# Patient Record
Sex: FEMALE | Race: WHITE | Employment: UNEMPLOYED | URBAN - METROPOLITAN AREA
[De-identification: names, ages, dates, MRNs, and addresses within clinical notes are randomized per-mention and may not be internally consistent; named-entity substitution may affect disease eponyms.]

---

## 2015-08-21 LAB — PAP-ABSTRACT: NORMAL

## 2017-03-07 ENCOUNTER — OFFICE VISIT (OUTPATIENT)
Dept: FAMILY MEDICINE | Facility: CLINIC | Age: 37
End: 2017-03-07
Payer: COMMERCIAL

## 2017-03-07 VITALS
DIASTOLIC BLOOD PRESSURE: 68 MMHG | HEIGHT: 63 IN | BODY MASS INDEX: 35.97 KG/M2 | HEART RATE: 80 BPM | SYSTOLIC BLOOD PRESSURE: 104 MMHG | TEMPERATURE: 98.1 F | OXYGEN SATURATION: 98 % | WEIGHT: 203 LBS

## 2017-03-07 DIAGNOSIS — G44.209 TENSION HEADACHE: ICD-10-CM

## 2017-03-07 DIAGNOSIS — J01.90 ACUTE SINUSITIS WITH SYMPTOMS > 10 DAYS: Primary | ICD-10-CM

## 2017-03-07 DIAGNOSIS — M25.552 HIP PAIN, LEFT: ICD-10-CM

## 2017-03-07 PROCEDURE — 99214 OFFICE O/P EST MOD 30 MIN: CPT | Performed by: NURSE PRACTITIONER

## 2017-03-07 RX ORDER — CYCLOBENZAPRINE HCL 10 MG
5-10 TABLET ORAL 3 TIMES DAILY PRN
Qty: 30 TABLET | Refills: 1 | Status: SHIPPED | OUTPATIENT
Start: 2017-03-07 | End: 2018-09-05

## 2017-03-07 NOTE — PATIENT INSTRUCTIONS
Flexeril sent to the pharmacy-for headache and hips  Augmentin sent to the pharmacy for sinus symptoms  Follow up if symptoms do not improve or worsen.  Preventing Tension Headaches  Lifestyle changes are the key to preventing tension-type headaches. Learn what changes in your environment and daily activities can prevent the strain and tension that lead to headaches. If emotional stress is a factor, stress reduction may bring relief. Other lifestyle changes can help keep you healthier and better able to cope with pain.  What may cause your headaches  Causes of tension-type headaches include:    Posture and movement. Your posture while you sit, work, drive, and even sleep can put stress on your shoulders and neck. This can tighten muscles in the back of your head, causing headaches.    Lifting and carrying. These can cause strain on your back and neck, especially if you carry too much weight, carry weight unevenly, or use poor lifting technique.    Certain sports. Activities that involve jumping, running, and sudden starts, stops, or changes of direction can jar your neck and head. This may lead to tight muscles and pain. Weightlifting and other activities that require upper body strength can lead to tight neck and shoulder muscles.    Jaw tension. Clenching your jaw or grinding your teeth can result in tension and pain. This may happen while you sleep without your knowing it.    Eye problems. Eyestrain can cause tension in the muscles around the eyes. Or a problem with your eyeglass prescription can make you hold your head at an awkward angle. This can cause neck strain and headaches.    Emotional stress. Many factors lead to emotional stress: overwork, family problems, financial difficulties, or sudden life changes. This may cause muscle tension.  What you can do  Once you know what s causing your headaches, you can work to prevent them. You may need to seek professional help to make some of the needed  changes.    Posture and movement changes. Change the setup of your workspace and car. Learn and maintain good posture. Avoid positions that strain your neck or shoulders. Make sure your bedding and pillows provide good support. Avoid sleeping on couches, chairs, or floors when a bed is available.     Lifting and carrying. Learn good lifting technique. Make sure to use the proper tools and equipment for lifting.    Change your sport. Switch to a low-impact sport. To help relieve stress on your neck and head, cut back on activities that depend on upper body strength or that put a lot of twisting motion on the back, such as golf.     Dental work. See your dentist if you think your headaches are caused by jaw tension or teeth grinding.    New eyewear. Buy an extra pair of glasses adjusted for reading or working at a computer. This helps to reduce eyestrain and keep your neck at a comfortable angle.    Stress management. Learn techniques for relaxing and reducing emotional stress, like deep breathing, visualization, progressive relaxation, and biofeedback. Regular sleep habits can also help to control stress.    Regular sleep and meals. It is important to have a regular sleep cycle and to avoid skipping meals.  Exercise can help  Exercise can improve overall health and help you to relax.    Neck exercises help keep your neck muscles relaxed during the day. Try the neck exercises shown on this sheet. Start in a neutral (relaxed, centered) position. Do 3 repetitions every 2 to 4 hours throughout the day.    Low-impact aerobic exercise helps keep your muscles strong and flexible. This helps prevent tension and the pain it causes.    Stretching, ej chi, and yoga help keep your muscles flexible. They may also help relieve emotional stress.    Lower your left ear toward your left shoulder. Return to neutral. Repeat on the right side.   Lower your chin to your chest and slowly return to neutral.     Look to the left. Return to  neutral. Then look to the right.     Move your head forward and back while keeping the top of your head level.     2654-1118 [employer]. [address] [place] [address], [place] [address]. All rights reserved. This information is not intended as a substitute for professional medical care. Always follow your healthcare professional's instructions.

## 2017-03-07 NOTE — NURSING NOTE
"Chief Complaint   Patient presents with     Sinus Problem       Initial /68 (Cuff Size: Adult Regular)  Pulse 80  Temp 98.1  F (36.7  C) (Tympanic)  Ht 5' 2.5\" (1.588 m)  Wt 203 lb (92.1 kg)  SpO2 98%  BMI 36.54 kg/m2 Estimated body mass index is 36.54 kg/(m^2) as calculated from the following:    Height as of this encounter: 5' 2.5\" (1.588 m).    Weight as of this encounter: 203 lb (92.1 kg).  Medication Reconciliation: complete    "

## 2017-03-07 NOTE — PROGRESS NOTES
SUBJECTIVE:                                                    Shy Mcfarlane is a 37 year old female who presents to clinic today for the following health issues:      ENT Symptoms             Symptoms: cc Present Absent Comment   Fever/Chills   x    Fatigue   x    Muscle Aches   x    Eye Irritation   x    Sneezing  x  Watery eyes    Nasal Alex/Drg x      Sinus Pressure/Pain x   Behind eyes    Loss of smell   x    Dental pain  x  Has had some dental work done in the last month.    Sore Throat   x    Swollen Glands   x    Ear Pain/Fullness  x  Left side    Cough   x    Wheeze   x    Chest Pain   x    Shortness of breath   x    Rash   x    Other  x  Pressure HA     Symptom duration:  3 weeks    Symptom severity:  mild   Treatments tried:  methocarbam- for jaw pain (from dentist) did not help- ibu   Contacts:  Family has had head colds.      Headache starts in neck and wraps around to temple     Joint or Musculoskeletal Pain  Duration of complaint: 1 + year   Description:   Location: left hip  Character: Dull ache  Intensity: mild  Progression of Symptoms: intermittent  Accompanying Signs & Symptoms: Other symptoms: none  History: Previous similar pain: no    Precipitating factors: Trauma or overuse: no  Alleviating factors: Improved by: activity  Therapies Tried and outcome: increasing activity     Improved with Methocarbamol from dentist for jaw pain.     Problem list and histories reviewed & adjusted, as indicated.  Additional history: as documented    There is no problem list on file for this patient.    History reviewed. No pertinent past surgical history.    Social History   Substance Use Topics     Smoking status: Former Smoker     Smokeless tobacco: Not on file     Alcohol use No     Family History   Problem Relation Age of Onset     CEREBROVASCULAR DISEASE Father      CANCER Paternal Grandmother      Lung Cancer Paternal Grandfather          Current Outpatient Prescriptions   Medication Sig Dispense  "Refill     cyclobenzaprine (FLEXERIL) 10 MG tablet Take 0.5-1 tablets (5-10 mg) by mouth 3 times daily as needed for muscle spasms 30 tablet 1     amoxicillin-clavulanate (AUGMENTIN) 875-125 MG per tablet Take 1 tablet by mouth 2 times daily 20 tablet 0     No Known Allergies  Labs reviewed in EPIC    Reviewed and updated as needed this visit by clinical staff       Reviewed and updated as needed this visit by Provider         ROS:  Constitutional, HEENT, cardiovascular, pulmonary, gi and gu systems are negative, except as otherwise noted.    OBJECTIVE:                                                    /68 (Cuff Size: Adult Regular)  Pulse 80  Temp 98.1  F (36.7  C) (Tympanic)  Ht 5' 2.5\" (1.588 m)  Wt 203 lb (92.1 kg)  SpO2 98%  BMI 36.54 kg/m2  Body mass index is 36.54 kg/(m^2).  GENERAL: healthy, alert and no distress  HENT: normal cephalic/atraumatic, both ears: clear effusion, nose and mouth without ulcers or lesions, oropharynx clear, oral mucous membranes moist and sinuses: not tender, maxillary swelling on bilateral  NECK: no adenopathy  RESP: lungs clear to auscultation - no rales, rhonchi or wheezes  CV: regular rate and rhythm, normal S1 S2, no S3 or S4, no murmur, click or rub, no peripheral edema and peripheral pulses strong  ABDOMEN: soft, nontender, no hepatosplenomegaly, no masses and bowel sounds normal  MS: tenderness present on upper trapezius and occipital area of neck, left hip with full range of motion and strength, left trochanter with tenderness to palpation  PSYCH: mentation appears normal, affect normal/bright    Diagnostic Test Results:  none      ASSESSMENT/PLAN:                                                      1. Acute sinusitis with symptoms > 10 days  Symptomatic care and follow up discussed  - amoxicillin-clavulanate (AUGMENTIN) 875-125 MG per tablet; Take 1 tablet by mouth 2 times daily  Dispense: 20 tablet; Refill: 0    2. Tension headache  Headache consistent with " tension.  Flexeril and PT ordered.  - cyclobenzaprine (FLEXERIL) 10 MG tablet; Take 0.5-1 tablets (5-10 mg) by mouth 3 times daily as needed for muscle spasms  Dispense: 30 tablet; Refill: 1  - PHYSICAL THERAPY REFERRAL    3. Hip pain, left  Ongoing left hip pain, is working on losing weight and feels that his will help.  Flexeril sent to the pharmacy and PT ordered.  - cyclobenzaprine (FLEXERIL) 10 MG tablet; Take 0.5-1 tablets (5-10 mg) by mouth 3 times daily as needed for muscle spasms  Dispense: 30 tablet; Refill: 1  - PHYSICAL THERAPY REFERRAL    Home care instructions were reviewed with the patient. The risks, benefits and treatment options of prescribed medications or other treatments have been discussed with the patient. The patient verbalized their understanding and should call or follow up if no improvement or if they develop further problems.      Patient Instructions       Flexeril sent to the pharmacy-for headache and hips  Augmentin sent to the pharmacy for sinus symptoms  Follow up if symptoms do not improve or worsen.  Preventing Tension Headaches  Lifestyle changes are the key to preventing tension-type headaches. Learn what changes in your environment and daily activities can prevent the strain and tension that lead to headaches. If emotional stress is a factor, stress reduction may bring relief. Other lifestyle changes can help keep you healthier and better able to cope with pain.  What may cause your headaches  Causes of tension-type headaches include:    Posture and movement. Your posture while you sit, work, drive, and even sleep can put stress on your shoulders and neck. This can tighten muscles in the back of your head, causing headaches.    Lifting and carrying. These can cause strain on your back and neck, especially if you carry too much weight, carry weight unevenly, or use poor lifting technique.    Certain sports. Activities that involve jumping, running, and sudden starts, stops, or  changes of direction can jar your neck and head. This may lead to tight muscles and pain. Weightlifting and other activities that require upper body strength can lead to tight neck and shoulder muscles.    Jaw tension. Clenching your jaw or grinding your teeth can result in tension and pain. This may happen while you sleep without your knowing it.    Eye problems. Eyestrain can cause tension in the muscles around the eyes. Or a problem with your eyeglass prescription can make you hold your head at an awkward angle. This can cause neck strain and headaches.    Emotional stress. Many factors lead to emotional stress: overwork, family problems, financial difficulties, or sudden life changes. This may cause muscle tension.  What you can do  Once you know what s causing your headaches, you can work to prevent them. You may need to seek professional help to make some of the needed changes.    Posture and movement changes. Change the setup of your workspace and car. Learn and maintain good posture. Avoid positions that strain your neck or shoulders. Make sure your bedding and pillows provide good support. Avoid sleeping on couches, chairs, or floors when a bed is available.     Lifting and carrying. Learn good lifting technique. Make sure to use the proper tools and equipment for lifting.    Change your sport. Switch to a low-impact sport. To help relieve stress on your neck and head, cut back on activities that depend on upper body strength or that put a lot of twisting motion on the back, such as golf.     Dental work. See your dentist if you think your headaches are caused by jaw tension or teeth grinding.    New eyewear. Buy an extra pair of glasses adjusted for reading or working at a computer. This helps to reduce eyestrain and keep your neck at a comfortable angle.    Stress management. Learn techniques for relaxing and reducing emotional stress, like deep breathing, visualization, progressive relaxation, and  biofeedback. Regular sleep habits can also help to control stress.    Regular sleep and meals. It is important to have a regular sleep cycle and to avoid skipping meals.  Exercise can help  Exercise can improve overall health and help you to relax.    Neck exercises help keep your neck muscles relaxed during the day. Try the neck exercises shown on this sheet. Start in a neutral (relaxed, centered) position. Do 3 repetitions every 2 to 4 hours throughout the day.    Low-impact aerobic exercise helps keep your muscles strong and flexible. This helps prevent tension and the pain it causes.    Stretching, ej chi, and yoga help keep your muscles flexible. They may also help relieve emotional stress.    Lower your left ear toward your left shoulder. Return to neutral. Repeat on the right side.   Lower your chin to your chest and slowly return to neutral.     Look to the left. Return to neutral. Then look to the right.     Move your head forward and back while keeping the top of your head level.     3835-3123 [employer]. [address] [place] [address], [place] [id number]. All rights reserved. This information is not intended as a substitute for professional medical care. Always follow your healthcare professional's instructions.            YULI Goldman [facility]

## 2017-03-07 NOTE — MR AVS SNAPSHOT
After Visit Summary   3/7/2017    Shy Mcfarlane    MRN: [id number]           Patient Information     Date Of Birth          1980        Visit Information        Provider Department      3/7/2017 8:20 AM Laurence Nesbitt APRN [facility]        Today's Diagnoses     Acute sinusitis with symptoms > 10 days    -  1    Tension headache        Hip pain, left          Care Instructions      Flexeril sent to the pharmacy-for headache and hips  Augmentin sent to the pharmacy for sinus symptoms  Follow up if symptoms do not improve or worsen.  Preventing Tension Headaches  Lifestyle changes are the key to preventing tension-type headaches. Learn what changes in your environment and daily activities can prevent the strain and tension that lead to headaches. If emotional stress is a factor, stress reduction may bring relief. Other lifestyle changes can help keep you healthier and better able to cope with pain.  What may cause your headaches  Causes of tension-type headaches include:    Posture and movement. Your posture while you sit, work, drive, and even sleep can put stress on your shoulders and neck. This can tighten muscles in the back of your head, causing headaches.    Lifting and carrying. These can cause strain on your back and neck, especially if you carry too much weight, carry weight unevenly, or use poor lifting technique.    Certain sports. Activities that involve jumping, running, and sudden starts, stops, or changes of direction can jar your neck and head. This may lead to tight muscles and pain. Weightlifting and other activities that require upper body strength can lead to tight neck and shoulder muscles.    Jaw tension. Clenching your jaw or grinding your teeth can result in tension and pain. This may happen while you sleep without your knowing it.    Eye problems. Eyestrain can cause tension in the muscles around the eyes. Or a problem with your eyeglass  prescription can make you hold your head at an awkward angle. This can cause neck strain and headaches.    Emotional stress. Many factors lead to emotional stress: overwork, family problems, financial difficulties, or sudden life changes. This may cause muscle tension.  What you can do  Once you know what s causing your headaches, you can work to prevent them. You may need to seek professional help to make some of the needed changes.    Posture and movement changes. Change the setup of your workspace and car. Learn and maintain good posture. Avoid positions that strain your neck or shoulders. Make sure your bedding and pillows provide good support. Avoid sleeping on couches, chairs, or floors when a bed is available.     Lifting and carrying. Learn good lifting technique. Make sure to use the proper tools and equipment for lifting.    Change your sport. Switch to a low-impact sport. To help relieve stress on your neck and head, cut back on activities that depend on upper body strength or that put a lot of twisting motion on the back, such as golf.     Dental work. See your dentist if you think your headaches are caused by jaw tension or teeth grinding.    New eyewear. Buy an extra pair of glasses adjusted for reading or working at a computer. This helps to reduce eyestrain and keep your neck at a comfortable angle.    Stress management. Learn techniques for relaxing and reducing emotional stress, like deep breathing, visualization, progressive relaxation, and biofeedback. Regular sleep habits can also help to control stress.    Regular sleep and meals. It is important to have a regular sleep cycle and to avoid skipping meals.  Exercise can help  Exercise can improve overall health and help you to relax.    Neck exercises help keep your neck muscles relaxed during the day. Try the neck exercises shown on this sheet. Start in a neutral (relaxed, centered) position. Do 3 repetitions every 2 to 4 hours throughout the  day.    Low-impact aerobic exercise helps keep your muscles strong and flexible. This helps prevent tension and the pain it causes.    Stretching, ej chi, and yoga help keep your muscles flexible. They may also help relieve emotional stress.    Lower your left ear toward your left shoulder. Return to neutral. Repeat on the right side.   Lower your chin to your chest and slowly return to neutral.     Look to the left. Return to neutral. Then look to the right.     Move your head forward and back while keeping the top of your head level.     2470-0606 [employer] [facility]. [address] [place] [address]. All rights reserved. This information is not intended as a substitute for professional medical care. Always follow your healthcare professional's instructions.              Follow-ups after your visit        Additional Services     PHYSICAL THERAPY REFERRAL       *This therapy referral will be filtered to a centralized scheduling office at [facility] and the patient will receive a call to schedule an appointment at a [place] location most convenient for them. *     [facility] provides Physical Therapy evaluation and treatment and many specialty services across the [place] system.  If requesting a specialty program, please choose from the list below.    If you have not heard from the scheduling office within 2 business days, please call [phone] for all locations, with the exception of [place], please call [phone].  Treatment: Evaluation & Treatment  Special Instructions/Modalities:   Special Programs: None    Please be aware that coverage of these services is subject to the terms and limitations of your health insurance plan.  Call member services at your health plan with any benefit or coverage questions.      **Note to Provider:  If you are referring outside of [place] for the therapy appointment, please list the name of the location in the  " special instructions  above, print the referral and give to the patient to schedule the appointment.                  Who to contact     If you have questions or need follow up information about today's clinic visit or your schedule please contact [facility] directly at [phone].  Normal or non-critical lab and imaging results will be communicated to you by MyChart, letter or phone within 4 business days after the clinic has received the results. If you do not hear from us within 7 days, please contact the clinic through [email or web address] or phone. If you have a critical or abnormal lab result, we will notify you by phone as soon as possible.  Submit refill requests through [email or web address] or call your pharmacy and they will forward the refill request to us. Please allow 3 business days for your refill to be completed.          Additional Information About Your Visit        [email or web address]"[employer] Information     [email or web address] lets you send messages to your doctor, view your test results, renew your prescriptions, schedule appointments and more. To sign up, go to [email or web address] . Click on \"Log in\" on the left side of the screen, which will take you to the Welcome page. Then click on \"Sign up Now\" on the right side of the page.     You will be asked to enter the access code listed below, as well as some personal information. Please follow the directions to create your username and password.     Your access code is: [email or web address]  Expires: 2017  9:07 AM     Your access code will  in 90 days. If you need help or a new code, please call your [facility] or [phone].        Care EveryWhere ID     This is your Care EveryWhere ID. This could be used by other organizations to access your [place] medical records  [id number]        Your Vitals Were     Pulse Temperature Height Pulse Oximetry BMI (Body Mass Index)       80 98.1  F (36.7  C) (Tympanic) 5' 2.5\" (1.588 m) 98% 36.54 kg/m2        Blood Pressure from " Last 3 Encounters:   03/07/17 104/68    Weight from Last 3 Encounters:   03/07/17 203 lb (92.1 kg)              We Performed the Following     PHYSICAL THERAPY REFERRAL          Today's Medication Changes          These changes are accurate as of: 3/7/17  9:07 AM.  If you have any questions, ask your nurse or doctor.               Start taking these medicines.        Dose/Directions    amoxicillin-clavulanate 875-125 MG per tablet   Commonly known as:  AUGMENTIN   Used for:  Acute sinusitis with symptoms > 10 days   Started by:  Laurence Nesbitt APRN CNP        Dose:  1 tablet   Take 1 tablet by mouth 2 times daily   Quantity:  20 tablet   Refills:  0       cyclobenzaprine 10 MG tablet   Commonly known as:  FLEXERIL   Used for:  Tension headache, Hip pain, left   Started by:  Laurence Nesbitt APRN CNP        Dose:  5-10 mg   Take 0.5-1 tablets (5-10 mg) by mouth 3 times daily as needed for muscle spasms   Quantity:  30 tablet   Refills:  1            Where to get your medicines      These medications were sent to [id number] [address] [facility]  [address] [facility] [address]    Hours:  Closed 10-16-08 business to [facility] Phone:  [phone]     amoxicillin-clavulanate 875-125 MG per tablet    cyclobenzaprine 10 MG tablet                Primary Care Provider Office Phone # Fax #    YULI Coelho [phone]       [facility] [address] [facility] [address]        Thank you!     Thank you for choosing [facility]  for your care. Our goal is always to provide you with excellent care. Hearing back from our patients is one way we can continue to improve our services. Please take a few minutes to complete the written survey that you may receive in the mail after your visit with us. Thank you!             Your Updated Medication List - Protect others around you: Learn how to safely use, store and  throw away your medicines at www.disposemymeds.org.          This list is accurate as of: 3/7/17  9:07 AM.  Always use your most recent med list.                   Brand Name Dispense Instructions for use    amoxicillin-clavulanate 875-125 MG per tablet    AUGMENTIN    20 tablet    Take 1 tablet by mouth 2 times daily       cyclobenzaprine 10 MG tablet    FLEXERIL    30 tablet    Take 0.5-1 tablets (5-10 mg) by mouth 3 times daily as needed for muscle spasms

## 2017-06-03 ENCOUNTER — HEALTH MAINTENANCE LETTER (OUTPATIENT)
Age: 37
End: 2017-06-03

## 2018-09-05 ENCOUNTER — OFFICE VISIT (OUTPATIENT)
Dept: FAMILY MEDICINE | Facility: CLINIC | Age: 38
End: 2018-09-05
Payer: COMMERCIAL

## 2018-09-05 VITALS
TEMPERATURE: 97.3 F | HEART RATE: 88 BPM | OXYGEN SATURATION: 99 % | DIASTOLIC BLOOD PRESSURE: 80 MMHG | BODY MASS INDEX: 34.91 KG/M2 | WEIGHT: 197 LBS | HEIGHT: 63 IN | SYSTOLIC BLOOD PRESSURE: 118 MMHG

## 2018-09-05 DIAGNOSIS — Z30.46 NEXPLANON REMOVAL: ICD-10-CM

## 2018-09-05 DIAGNOSIS — Z00.00 ROUTINE GENERAL MEDICAL EXAMINATION AT A HEALTH CARE FACILITY: Primary | ICD-10-CM

## 2018-09-05 DIAGNOSIS — Z30.017 NEXPLANON INSERTION: ICD-10-CM

## 2018-09-05 LAB
ANION GAP SERPL CALCULATED.3IONS-SCNC: 5 MMOL/L (ref 3–14)
BUN SERPL-MCNC: 9 MG/DL (ref 7–30)
CALCIUM SERPL-MCNC: 8.8 MG/DL (ref 8.5–10.1)
CHLORIDE SERPL-SCNC: 109 MMOL/L (ref 94–109)
CHOLEST SERPL-MCNC: 218 MG/DL
CO2 SERPL-SCNC: 26 MMOL/L (ref 20–32)
CREAT SERPL-MCNC: 0.87 MG/DL (ref 0.52–1.04)
GFR SERPL CREATININE-BSD FRML MDRD: 72 ML/MIN/1.7M2
GLUCOSE SERPL-MCNC: 80 MG/DL (ref 70–99)
HDLC SERPL-MCNC: 35 MG/DL
LDLC SERPL CALC-MCNC: 149 MG/DL
NONHDLC SERPL-MCNC: 183 MG/DL
POTASSIUM SERPL-SCNC: 3.8 MMOL/L (ref 3.4–5.3)
SODIUM SERPL-SCNC: 140 MMOL/L (ref 133–144)
TRIGL SERPL-MCNC: 172 MG/DL

## 2018-09-05 PROCEDURE — 99395 PREV VISIT EST AGE 18-39: CPT | Mod: 25 | Performed by: NURSE PRACTITIONER

## 2018-09-05 PROCEDURE — 11983 REMOVE/INSERT DRUG IMPLANT: CPT | Performed by: NURSE PRACTITIONER

## 2018-09-05 PROCEDURE — 87624 HPV HI-RISK TYP POOLED RSLT: CPT | Performed by: NURSE PRACTITIONER

## 2018-09-05 PROCEDURE — G0145 SCR C/V CYTO,THINLAYER,RESCR: HCPCS | Performed by: NURSE PRACTITIONER

## 2018-09-05 PROCEDURE — 80061 LIPID PANEL: CPT | Performed by: NURSE PRACTITIONER

## 2018-09-05 PROCEDURE — 80048 BASIC METABOLIC PNL TOTAL CA: CPT | Performed by: NURSE PRACTITIONER

## 2018-09-05 PROCEDURE — 36415 COLL VENOUS BLD VENIPUNCTURE: CPT | Performed by: NURSE PRACTITIONER

## 2018-09-05 NOTE — LETTER
September 12, 2018    Shy Mcfarlane   [address] [place] [address]    Dear Shy,  We are happy to inform you that your PAP smear result from 9/5/18 is normal.  We are now able to do a follow up test on PAP smears. The DNA test is for HPV (Human Papilloma Virus). Cervical cancer is closely linked with certain types of HPV. Your results showed no evidence of high risk HPV.  Therefore we recommend you return in 5 years for your next pap smear and HPV test.  You will still need to return to the clinic every year for an annual exam and other preventive tests.  Please contact the clinic at [phone] with any questions.  Sincerely,    Kristin Tena, YULI CNP/rlm

## 2018-09-05 NOTE — MR AVS SNAPSHOT
After Visit Summary   9/5/2018    Shy Mcfarlane    MRN: [id number]           Patient Information     Date Of Birth          1980        Visit Information        Provider Department      9/5/2018 9:00 AM Kristin Tena APRN CNP [facility]        Today's Diagnoses     Routine general medical examination at a health care facility    -  1    Nexplanon insertion        Nexplanon removal          Care Instructions      Preventive Health Recommendations  Female Ages 26 - 39  Yearly exam:   See your health care provider every year in order to    Review health changes.     Discuss preventive care.      Review your medicines if you your doctor has prescribed any.    Until age 30: Get a Pap test every three years (more often if you have had an abnormal result).    After age 30: Talk to your doctor about whether you should have a Pap test every 3 years or have a Pap test with HPV screening every 5 years.   You do not need a Pap test if your uterus was removed (hysterectomy) and you have not had cancer.  You should be tested each year for STDs (sexually transmitted diseases), if you're at risk.   Talk to your provider about how often to have your cholesterol checked.  If you are at risk for diabetes, you should have a diabetes test (fasting glucose).  Shots: Get a flu shot each year. Get a tetanus shot every 10 years.   Nutrition:     Eat at least 5 servings of fruits and vegetables each day.    Eat whole-grain bread, whole-wheat pasta and brown rice instead of white grains and rice.    Get adequate Calcium and Vitamin D.     Lifestyle    Exercise at least 150 minutes a week (30 minutes a day, 5 days of the week). This will help you control your weight and prevent disease.    Limit alcohol to one drink per day.    No smoking.     Wear sunscreen to prevent skin cancer.    See your dentist every six months for an exam and cleaning.  [facility] Mammo Schedule    [place] ~  [phone]  One day weekly-Alternating Days    [place] ~ [phone]  Every other Monday or Wednesday &  One Saturday morning a month    [place] ~ [phone]  Every Other Monday afternoon     [place] ~[phone]  Every other Monday morning    [place] ~ [phone]  Every Monday Morning  Every Tuesday Afternoon   Wed, Thurs, Friday morning and afternoon    Prevention Guidelines, Women Ages 18 to 39  Screening tests and vaccines are an important part of managing your health. A screening test is done to find possible disorders or diseases in people who don't have any symptoms. The goal is to find a disease early so lifestyle changes can be made and you can be watched more closely to reduce the risk of disease, or to detect it early enough to treat it most effectively. Screening tests are not considered diagnostic, but are used to determine if more testing is needed. Health counseling is essential, too. Below are guidelines for these, for women ages 18 to 39. Talk with your healthcare provider to make sure you re up-to-date on what you need.  Screening Who needs it How often   Alcohol misuse All women in this age group At routine exams   Blood pressure All women in this age group Yearly checkup if your blood pressure is normal  Normal blood pressure is less than 120/80 mm Hg  If your blood pressure reading is higher than normal, follow the advice of your healthcare provider   Breast cancer All women in this age group should talk with their healthcare providers about the need for clinical breast exams (CBE)1 Clinical breast exam every 3 years1   Cervical cancer Women ages 21 and older Women between ages 21 and 29 should have a Pap test every 3 years; women between ages 30 and 65 are advised to have a Pap test plus an HPV test every 5 years   Chlamydia Sexually active women ages 24 and younger, and women at increased risk for infection Every 3 years if you're at risk or have symptoms   Depression All  women in this age group At routine exams   Type 2 diabates, prediabetes All women with no symptoms who are overweight or obese and have 1 or more other risk factors for diabetes At least every 3 years. Also, testing for diabetes during pregnancy after the 24th week.    Type 2 diabetes, prediabetes All women diagnosed with gestational diabetes Lifelong testing every 3 years   Type 2 diabetes All women with prediabetes Every year   Gonorrhea Sexually active women at increased risk for infection At routine exams   Hepatitis C Anyone at increased risk At routine exams   HIV All women should be tested at least once for HIV between the ages of 13 and 64 At routine exams. Those with risk factors for HIV should be tested at least annually.    Obesity All women in this age group At routine exams   Syphilis Women at increased risk for infection should talk with their healthcare provider At routine exams   Tuberculosis Women at increased risk for infection should talk with their healthcare provider Ask your healthcare provider   Vision All women in this age group At least 1 complete exam in your 20s, and 2 in your 30s   Vaccine2 Who needs it How often   Chickenpox (varicella) All women in this age group who have no record of this infection or vaccine 2 doses; the second dose should be given 4 to 8 weeks after the first dose   Hepatitis A Women at increased risk for infection should talk with their healthcare provider 2 doses given at least 6 months apart   Hepatitis B Women at increased risk for infection should talk with their healthcare provider 3 doses over 6 months; second dose should be given 1 month after the first dose; the third dose should be given at least 2 months after the second dose and at least 4 months after the first dose   Haemophilus influenzae Type B (HIB) Women at increased risk for infection should talk with their healthcare provider 1 to 3 doses   Human papillomavirus (HPV) All women in this age group up  to age 26 3 doses; the second dose should be given 1 to 2 months after the first dose and the third dose given 6 months after the first dose   Influenza (flu) All women in this age group Once a year   Measles, mumps, rubella (MMR) All women in this age group who have no record of these infections or vaccines 1 or 2 doses   Meningococcal Women at increased risk for infection should talk with their healthcare provider 1 or more doses   Pneumococcal conjugate vaccine (PCV13) and pneumococcal polysaccharide vaccine (PPSV23) Women at increased risk for infection should talk with their healthcare provider PCV13: 1 dose ages 19 to 65 (protects against 13 types of pneumococcal bacteria)  PPSV23: 1 to 2 doses through age 64, or 1 dose at 65 or older (protects against 23 types of pneumococcal bacteria)      Tetanus/diphtheria/pertussis (Td/Tdap) booster All women in this age group Td every 10 years, or a one-time dose of Tdap instead of a Td booster after age 18, then Td every 10 years   Counseling Who needs it How often   BRCA gene mutation testing for breast and ovarian cancer susceptibility Women with increased risk for having gene mutation When your risk is known   Breast cancer and chemoprevention Women at high risk for breast cancer When your risk is known   Diet and exercise Women who are overweight or obese When diagnosed, and then at routine exams   Domestic violence Women at the age in which they are able to have children At routine exams   Sexually transmitted infection prevention Women who are sexually active At routine exams   Skin cancer Prevention of skin cancer in fair-skinned adults At routine exams   Use of tobacco and the health effects it can cause All women in this age group Every visit   1According to the ACS, women ages 20 to 39 years should have a clinical breast exam (CBE) as part of their routine health exam every 3 years. Breast self-exams are an option for women starting in their 20s. But the  USPSTF does not recommend CBE.  2Those who are 18 years old and not up-to-date on their childhood vaccines should get all appropriate catch-up vaccines recommended by the CDC.  Date Last Reviewed: 10/1/2017    2402-9889 [employer]. [address] [place] [address]91. All rights reserved. This information is not intended as a substitute for professional medical care. Always follow your healthcare professional's instructions.        Etonogestrel implant  Brand Name: Nexplanon  What is this medicine?  ETONOGESTREL (et oh rc CHANEL trel) is a contraceptive (birth control) device. It is used to prevent pregnancy. It can be used for up to 3 years.  How should I use this medicine?  This device is inserted just under the skin on the inner side of your upper arm by a health care professional.  Talk to your pediatrician regarding the use of this medicine in children. Special care may be needed.  What side effects may I notice from receiving this medicine?  Side effects that you should report to your doctor or health care professional as soon as possible:    allergic reactions like skin rash, itching or hives, swelling of the face, lips, or tongue    breast lumps    changes in emotions or moods    depressed mood    heavy or prolonged menstrual bleeding    pain, irritation, swelling, or bruising at the insertion site    scar at site of insertion    signs of infection at the insertion site such as fever, and skin redness, pain or discharge    signs of pregnancy    signs and symptoms of a blood clot such as breathing problems; changes in vision; chest pain; severe, sudden headache; pain, swelling, warmth in the leg; trouble speaking; sudden numbness or weakness of the face, arm or leg    signs and symptoms of liver injury like dark yellow or brown urine; general ill feeling or flu-like symptoms; light-colored stools; loss of appetite; nausea; right upper belly pain; unusually weak or tired; yellowing of the  eyes or skin    unusual vaginal bleeding, discharge    signs and symptoms of a stroke like changes in vision; confusion; trouble speaking or understanding; severe headaches; sudden numbness or weakness of the face, arm or leg; trouble walking; dizziness; loss of balance or coordination  Side effects that usually do not require medical attention (report to your doctor or health care professional if they continue or are bothersome):    acne    back pain    breast pain    changes in weight    dizziness    general ill feeling or flu-like symptoms    headache    irregular menstrual bleeding    nausea    sore throat    vaginal irritation or inflammation  What may interact with this medicine?  Do not take this medicine with any of the following medications:    amprenavir    bosentan    fosamprenavir  This medicine may also interact with the following medications:    barbiturate medicines for inducing sleep or treating seizures    certain medicines for fungal infections like ketoconazole and itraconazole    grapefruit juice    griseofulvin    medicines to treat seizures like carbamazepine, felbamate, oxcarbazepine, phenytoin, topiramate    modafinil    phenylbutazone    rifampin    rufinamide    some medicines to treat HIV infection like atazanavir, indinavir, lopinavir, nelfinavir, tipranavir, ritonavir    Navya's wort  What if I miss a dose?  This does not apply.  Where should I keep my medicine?  This drug is given in a hospital or clinic and will not be stored at home.  What should I tell my health care provider before I take this medicine?  They need to know if you have any of these conditions:    abnormal vaginal bleeding    blood vessel disease or blood clots    cancer of the breast, cervix, or liver    depression    diabetes    gallbladder disease    headaches    heart disease or recent heart attack    high blood pressure    high cholesterol    kidney disease    liver disease    renal  disease    seizures    tobacco smoker    an unusual or allergic reaction to etonogestrel, other hormones, anesthetics or antiseptics, medicines, foods, dyes, or preservatives    pregnant or trying to get pregnant    breast-feeding  What should I watch for while using this medicine?  This product does not protect you against HIV infection (AIDS) or other sexually transmitted diseases.  You should be able to feel the implant by pressing your fingertips over the skin where it was inserted. Contact your doctor if you cannot feel the implant, and use a non-hormonal birth control method (such as condoms) until your doctor confirms that the implant is in place. If you feel that the implant may have broken or become bent while in your arm, contact your healthcare provider.  NOTE:This sheet is a summary. It may not cover all possible information. If you have questions about this medicine, talk to your doctor, pharmacist, or health care provider. Copyright  2018 [employer]                Follow-ups after your visit        Future tests that were ordered for you today     Open Future Orders        Priority Expected Expires Ordered    *MA Screening Digital Bilateral Routine  9/5/2019 9/5/2018            Who to contact     If you have questions or need follow up information about today's clinic visit or your schedule please contact [facility] directly at [phone].  Normal or non-critical lab and imaging results will be communicated to you by MyChart, letter or phone within 4 business days after the clinic has received the results. If you do not hear from us within 7 days, please contact the clinic through MyChart or phone. If you have a critical or abnormal lab result, we will notify you by phone as soon as possible.  Submit refill requests through [employer] or call your pharmacy and they will forward the refill request to us. Please allow 3 business days for your refill to be completed.          Additional  "Information About Your Visit        MyChart Information     [employer] lets you send messages to your doctor, view your test results, renew your prescriptions, schedule appointments and more. To sign up, go to [email or web address] . Click on \"Log in\" on the left side of the screen, which will take you to the Welcome page. Then click on \"Sign up Now\" on the right side of the page.     You will be asked to enter the access code listed below, as well as some personal information. Please follow the directions to create your username and password.     Your access code is: [email or web address]  Expires: 2018  9:57 AM     Your access code will  in 90 days. If you need help or a new code, please call your [facility] clinic or [phone].        Care EveryWhere ID     This is your Care EveryWhere ID. This could be used by other organizations to access your [facility] medical records  [id number]        Your Vitals Were     Pulse Temperature Height Last Period Pulse Oximetry BMI (Body Mass Index)    88 97.3  F (36.3  C) (Tympanic) 5' 2.5\" (1.588 m) 2018 99% 35.46 kg/m2       Blood Pressure from Last 3 Encounters:   18 118/80   17 104/68   09/11/15 120/68    Weight from Last 3 Encounters:   18 197 lb (89.4 kg)   17 203 lb (92.1 kg)   09/11/15 208 lb 6 oz (94.5 kg)              We Performed the Following     Basic metabolic panel     Lipid Profile (Chol, Trig, HDL, LDL calc)          Today's Medication Changes          These changes are accurate as of 18  9:57 AM.  If you have any questions, ask your nurse or doctor.               Stop taking these medicines if you haven't already. Please contact your care team if you have questions.     amoxicillin-clavulanate 875-125 MG per tablet   Commonly known as:  AUGMENTIN   Stopped by:  Kristin Tena APRN CNP           cyclobenzaprine 10 MG tablet   Commonly known as:  FLEXERIL   Stopped by:  Kristin Tena APRN CNP                    Primary " Care Provider Office Phone # Fax #    YULI Coelho [phone] [address] [place] [id number]        Equal Access to Services     RONALD MILLAN : Hadii aad ku hadalfreditoo Sopravinali, waaxda luqadaha, qaybta kaalmada ademarlyda, herminio garciarosa saenzramiro elmorebuddy lópez. So [facility] [phone].    ATENCIÓN: Si habla español, tiene a sierra disposición servicios gratuitos de asistencia lingüística. Llame al [phone].    We comply with applicable federal civil rights laws and [place] laws. We do not discriminate on the basis of race, color, national origin, age, disability, sex, sexual orientation, or gender identity.            Thank you!     Thank you for choosing [facility]  for your care. Our goal is always to provide you with excellent care. Hearing back from our patients is one way we can continue to improve our services. Please take a few minutes to complete the written survey that you may receive in the mail after your visit with us. Thank you!             Your Updated Medication List - Protect others around you: Learn how to safely use, store and throw away your medicines at www.disposemymeds.org.      Notice  As of 9/5/2018  9:57 AM    You have not been prescribed any medications.

## 2018-09-05 NOTE — PROGRESS NOTES
SUBJECTIVE:   CC: Shy Mcfarlane is an 38 year old woman who presents for preventive health visit.     Answers for HPI/ROS submitted by the patient on 9/5/2018   Annual Exam:  Getting at least 3 servings of Calcium per day:: NO  Bi-annual eye exam:: Yes  Dental care twice a year:: Yes  Sleep apnea or symptoms of sleep apnea:: None  Diet:: Regular (no restrictions)  Frequency of exercise:: None  Taking medications regularly:: Not Applicable  Medication side effects:: Not applicable  Additional concerns today:: No  PHQ-2 Score: 0      Today's PHQ-2 Score:   PHQ-2 ( 1999 [employer]) 9/5/2018   Q1: Little interest or pleasure in doing things 0   Q2: Feeling down, depressed or hopeless 0   PHQ-2 Score 0   Q1: Little interest or pleasure in doing things Not at all   Q2: Feeling down, depressed or hopeless Not at all   PHQ-2 Score 0       Abuse: Current or Past(Physical, Sexual or Emotional)- No  Do you feel safe in your environment - Yes    Social History   Substance Use Topics     Smoking status: Former Smoker     Smokeless tobacco: Never Used     Alcohol use No     If you drink alcohol do you typically have >3 drinks per day or >7 drinks per week? No                     Reviewed orders with patient.  Reviewed health maintenance and updated orders accordingly - Yes  BP Readings from Last 3 Encounters:   09/05/18 118/80   03/07/17 104/68   09/11/15 120/68    Wt Readings from Last 3 Encounters:   09/05/18 197 lb (89.4 kg)   03/07/17 203 lb (92.1 kg)   09/11/15 208 lb 6 oz (94.5 kg)                  There is no problem list on file for this patient.    History reviewed. No pertinent surgical history.    Social History   Substance Use Topics     Smoking status: Former Smoker     Smokeless tobacco: Never Used     Alcohol use No     Family History   Problem Relation Age of Onset     Cerebrovascular Disease Father      Cancer Paternal Grandmother      Lung Cancer Paternal Grandfather          No current outpatient  "prescriptions on file.       Mammogram not appropriate for this patient based on age.    Pertinent mammograms are reviewed under the imaging tab.  History of abnormal Pap smear: NO - age 30-65 PAP every 5 years with negative HPV co-testing recommended     Reviewed and updated as needed this visit by clinical staff  Tobacco  Allergies  Med Hx  Surg Hx  Fam Hx  Soc Hx        Reviewed and updated as needed this visit by Provider        Past Medical History:   Diagnosis Date     Skin cancer       History reviewed. No pertinent surgical history.  Obstetric History     No data available          ROS:  CONSTITUTIONAL: NEGATIVE for fever, chills, change in weight  INTEGUMENTARU/SKIN: NEGATIVE for worrisome rashes, moles or lesions  EYES: NEGATIVE for vision changes or irritation  ENT: NEGATIVE for ear, mouth and throat problems  RESP: NEGATIVE for significant cough or SOB  BREAST: NEGATIVE for masses, tenderness or discharge  CV: NEGATIVE for chest pain, palpitations or peripheral edema  GI: NEGATIVE for nausea, abdominal pain, heartburn, or change in bowel habits  : NEGATIVE for unusual urinary or vaginal symptoms. Periods are regular.  MUSCULOSKELETAL: NEGATIVE for significant arthralgias or myalgia  NEURO: NEGATIVE for weakness, dizziness or paresthesias  PSYCHIATRIC: NEGATIVE for changes in mood or affect    OBJECTIVE:   /80 (BP Location: Right arm)  Pulse 88  Temp 97.3  F (36.3  C) (Tympanic)  Ht 5' 2.5\" (1.588 m)  Wt 197 lb (89.4 kg)  LMP 08/20/2018  SpO2 99%  BMI 35.46 kg/m2  EXAM:  GENERAL: healthy, alert and no distress  EYES: Eyes grossly normal to inspection, PERRL and conjunctivae and sclerae normal  HENT: ear canals and TM's normal, nose and mouth without ulcers or lesions  NECK: no adenopathy, no asymmetry, masses, or scars and thyroid normal to palpation  RESP: lungs clear to auscultation - no rales, rhonchi or wheezes  BREAST: normal without masses, tenderness or nipple discharge and no " palpable axillary masses or adenopathy  CV: regular rate and rhythm, normal S1 S2, no S3 or S4, no murmur, click or rub, no peripheral edema and peripheral pulses strong  ABDOMEN: soft, nontender, no hepatosplenomegaly, no masses and bowel sounds normal  MS: no gross musculoskeletal defects noted, no edema  SKIN: no suspicious lesions or rashes  NEURO: Normal strength and tone, mentation intact and speech normal  PSYCH: mentation appears normal, affect normal/bright    No pregnancy  test nexplanon is current and is replacing the nexplanon unit today     ASSESSMENT/PLAN:       ICD-10-CM    1. Routine general medical examination at a health care facility Z00.00 Basic metabolic panel     Lipid Profile (Chol, Trig, HDL, LDL calc)     Pap imaged thin layer screen with HPV - recommended age 30 - 65 years (select HPV order below)     HPV High Risk Types DNA Cervical     CANCELED: *MA Screening Digital Bilateral   2. Nexplanon insertion Z30.017 etonogestrel (IMPLANON/NEXPLANON) 68 MG IMPL     ETONOGESTREL IMPLANT SYSTEM     REMOVAL AND REINSERTION NEXPLANON   3. Nexplanon removal Z30.46 etonogestrel (IMPLANON/NEXPLANON) 68 MG IMPL     ETONOGESTREL IMPLANT SYSTEM     REMOVAL AND REINSERTION NEXPLANON       The procedure was explained to the patient and informed Consent was obtained    Left  arm placed above head with the patient in a supine position on the examining bed.  Nexplaonon placed site was identified, cleaned with betadine.  Using a 15 scalpel a 3 mm incision was made, using a small curved atrial forceps, the tip of the nexplaonon as identified and pulled out without complications.      Appropriate for Nexplanon Insertion today      PROCEDURE: Consent was reviewed and signed.  Patient was placed supine with left arm exposed.  Adal was made 8-10 cm above medial epicondyle and a guiding adal 4 cm above the first.  Arm was prepped with Alcohol. Insertion point was anesthetized with 2 mL 1% lidocaine. After stretching  "the skin with thumb and index finger around the insertion site, skin punctured with the tip of the needle inserted at 30 degrees and then lowered to horizontal position. The needle was then advanced to its full length. Applicator was then stabilized and slider was unlocked. Slider was pulled back until it stopped and then removed. Steri strips were placed.   Correct placement of the implant was confirmed by palpation in the patient's arm and visualizing the purple top of the obturator.   Bandage and pressure dressing applied to insertion site. Pt tolerated procedure well without complications. EBL was minimal.      COUNSELING:   Reviewed preventive health counseling, as reflected in patient instructions       Regular exercise       Healthy diet/nutrition       Vision screening       Hearing screening       Contraception       Family planning       Safe sex practices/STD prevention    BP Readings from Last 1 Encounters:   09/05/18 118/80     Estimated body mass index is 35.46 kg/(m^2) as calculated from the following:    Height as of this encounter: 5' 2.5\" (1.588 m).    Weight as of this encounter: 197 lb (89.4 kg).      Weight management plan: Discussed healthy diet and exercise guidelines and patient will follow up in 3 months in clinic to re-evaluate.     reports that she has quit smoking. She has never used smokeless tobacco.     Counseling Resources:  ATP IV Guidelines  Pooled Cohorts Equation Calculator  Breast Cancer Risk Calculator  FRAX Risk Assessment  ICSI Preventive Guidelines  Dietary Guidelines for Americans, 2010  USDA's MyPlate  ASA Prophylaxis  Lung CA Screening    YULI Mejia CNP  [facility]  "

## 2018-09-05 NOTE — LETTER
September 6, 2018      Shydelfin Mcfarlane  [address] [place] [id number]        Dear ,    We are writing to inform you of your test results.    negative basic panel.   your cholesterol level is elevated at 218 her good cholesterol which is the HDL is at 35 we like to see this above 49 and her LDL is at 149 we like to see that below 100 at this point I would recommend starting a cholesterol medication and working on diet and exercise.    As we discussed on the phone, work on diet and exercise and recheck a fasting lipid profile in 3 months. (By a lab only appointment).    Resulted Orders   Basic metabolic panel   Result Value Ref Range    Sodium 140 133 - 144 mmol/L    Potassium 3.8 3.4 - 5.3 mmol/L    Chloride 109 94 - 109 mmol/L    Carbon Dioxide 26 20 - 32 mmol/L    Anion Gap 5 3 - 14 mmol/L    Glucose 80 70 - 99 mg/dL      Comment:      Non Fasting    Urea Nitrogen 9 7 - 30 mg/dL    Creatinine 0.87 0.52 - 1.04 mg/dL    GFR Estimate 72 >60 mL/min/1.7m2      Comment:      Non  GFR Calc    GFR Estimate If Black 88 >60 mL/min/1.7m2      Comment:       GFR Calc    Calcium 8.8 8.5 - 10.1 mg/dL   Lipid Profile (Chol, Trig, HDL, LDL calc)   Result Value Ref Range    Cholesterol 218 (H) <200 mg/dL      Comment:      Desirable:       <200 mg/dl    Triglycerides 172 (H) <150 mg/dL      Comment:      Borderline high:  150-199 mg/dl  High:             200-499 mg/dl  Very high:       >499 mg/dl  Non Fasting      HDL Cholesterol 35 (L) >49 mg/dL    LDL Cholesterol Calculated 149 (H) <100 mg/dL      Comment:      Above desirable:  100-129 mg/dl  Borderline High:  130-159 mg/dL  High:             160-189 mg/dL  Very high:       >189 mg/dl      Non HDL Cholesterol 183 (H) <130 mg/dL      Comment:      Above Desirable:  130-159 mg/dl  Borderline high:  160-189 mg/dl  High:             190-219 mg/dl  Very high:       >219 mg/dl         If you have any questions or concerns, please call the  clinic at the number listed above.       Sincerely,        Kristin Tena, YULI CNP/dw        Eating Heart-Healthy Food: Using the DASH Plan    Eating for your heart doesn t have to be hard or boring. You just need to know how to make healthier choices. The DASH eating plan has been developed to help you do just that. DASH stands for Dietary Approaches to Stop Hypertension. It is a plan that has been proven to be healthier for your heart and to lower your risk for high blood pressure. It can also help lower your risk for cancer, heart disease, osteoporosis, and diabetes.  Choosing from each food group  Choose foods from each of the food groups below each day. Try to get the recommended number of servings for each food group. The serving numbers are based on a diet of 2,000 calories a day. Talk to your doctor if you re unsure about your calorie needs. Along with getting the correct servings, the DASH plan also recommends a sodium intake less than 2,300 mg per day.          Grains  Servings: 6 to 8 a day  A serving is:    1 slice bread    1 ounce dry cereal    Half a cup cooked rice, pasta or cereal  Best choices: Whole grains and any grains high in fiber. Vegetables  Servings: 4 to 5 a day  A serving is:    1 cup raw leafy vegetable    Half a cup cut-up raw or cooked vegetable    Half a cup vegetable juice  Best choices: Fresh or frozen vegetables prepared without added salt or fat.   Fruits  Servings: 4 to 5 a day  A serving is:    1 medium fruit    One-quarter cup dried fruit    Half a cup fresh, frozen, or canned fruit    Half a cup of 100% fruit juices  Best choices: A variety of fresh fruits of different colors. Whole fruits are a better choice than fruit juices. Low-fat or fat-free dairy  Servings: 2 to 3 a day  A serving is:    1 cup milk    1 cup yogurt    One and a half ounces cheese  Best choices: Skim or 1% milk, low-fat or fat-free yogurt or buttermilk, and low-fat cheeses.           Lean meats, poultry,  fish  Servings: 6 or fewer a day  A serving is:    1 ounce cooked meats, poultry, or fish    1 egg  Best choices: Lean poultry and fish. Trim away visible fat. Broil, grill, roast, or boil instead of frying. Remove skin from poultry before eating. Limit how much red meat you eat.  Nuts, seeds, beans  Servings: 4 to 5 a week  A serving is:    One-third cup nuts (one and a half ounces)    2 tablespoons nut butter or seeds    Half a cup cooked dry beans or legumes  Best choices: Dry roasted nuts with no salt added, lentils, kidney beans, garbanzo beans, and whole duran beans.   Fats and oils  Servings: 2 to 3 a day  A serving is:    1 teaspoon vegetable oil    1 teaspoon soft margarine    1 tablespoon mayonnaise    2 tablespoons salad dressing  Best choices: Nut and vegetable oils (nontropical vegetable oils), such as olive and canola oil. Sweets  Servings: 5 a week or fewer  A serving is:    1 tablespoon sugar, maple syrup, or honey    1 tablespoon jam or jelly    1 half-ounce jelly beans (about 15)    1 cup lemonade  Best choices: Dried fruit can be a satisfying sweet. Choose low-fat sweets. And watch your serving sizes!       For more on the DASH eating plan, visit:  www.nhlbi.nih.gov/health/health-topics/topics/dash   Date Last Reviewed: 6/1/2016 2000-2017 [employer]. [address] [place] [address]. All rights reserved. This information is not intended as a substitute for professional medical care. Always follow your healthcare professional's instructions.        Lifestyle Changes to Control Cholesterol  You can control your cholesterol through diet, exercise, weight management, quitting smoking, stress management, and taking your medicines right. These things can also lower your risk for cardiovascular disease.    Eating healthy  Your healthcare provider will give you information on diet changes you may need to make. Your provider may recommend that you see a registered dietitian for  help with diet changes. Changes may include:    Cutting back on the amount of fat and cholesterol in your meals    Eating less salt (sodium). This is especially important if you have high blood pressure.    Eating more fresh vegetables and fruits    Eating lean proteins such as fish, poultry, beans, and peas    Eating less red meat and processed meats    Using low-fat dairy products    Using vegetable and nut oils in limited amounts    Limiting how many sweets and processed foods like chips, cookies, and baked goods that you eat     Limiting how many sugar-sweetened beverages you drink    Limiting how often you eat out  Getting exercise  Regular exercise is a good way to help your body control cholesterol. Regular exercise can help in many ways. It can:    Raise your good cholesterol    Help lower your bad cholesterol    Let blood flow better through your body    Give more oxygen to your muscles and tissues    Help you manage your weight    Help your heart pump better    Lower your blood pressure  Your healthcare provider may recommend that you get more physical activity if you haven't been active. Your provider may recommend that you get moderate to vigorous physical activity for at least 40 minutes each day. You should do this for at least 3 to 4 days each week. A few examples of moderate to vigorous activity are:    Walking at a brisk pace. This is about 3 to 4 miles per hour.    Jogging or running    Swimming or water aerobics    Hiking    Dancing    Martial arts    Tennis    Riding a bicycle or stationary bike    Dancing  Managing your weight  If you are overweight or obese, your healthcare provider will work with you to help you lose weight and lower your BMI (body mass index). Making diet changes and getting more physical activity can help. Changing your diet will help you lose weight more easily than adding exercise.  Quitting smoking  Smoking and other tobacco use can raise cholesterol and make it harder to  control. Quitting is tough. But millions of people have given up tobacco for good. You can quit, too! Think about some of the reasons below to quit smoking. Do any of them make you think twice about your smoking habit?  Stop smoking because it:    Keeps your cholesterol high, even if you make all the other changes you re supposed to    Damages your body. It especially harms your heart, lungs, skin, and blood vessels.    Makes you more likely to have a heart attack (acute myocardial infarction), stroke, or cancer    Stains your teeth    Makes your skin, clothes, and breath smell bad    Costs a lot of money  Controlling stress   Learn ways to control stress. This will help you deal with stress in your home and work life. Controlling stress can greatly lower your risk of getting cardiovascular disease.  Making the most of medicines  Healthy eating and exercise are a good start to keeping your cholesterol down. But you may need some extra help from medicine. If your doctor prescribes medicine, be sure to take it exactly as directed. Remember:    Tell your healthcare provider about all other medicines you take. This includes vitamins and herbs.    Tell your healthcare provider if you have any side effects after starting to take a medicine. Examples of side effects to watch for include muscle aches, weakness, blurred vision, rust-colored urine, yellowing of eyes or skin (jaundice), and headache.    Don t skip a dose or stop taking your medicine because you feel better or because your cholesterol numbers go down. Never stop taking your medicine unless your healthcare provider has told you it s OK.    Ask your healthcare provider if you have any questions about your medicines.  High risk groups  Some people may need to take medicines called statins to control their cholesterol. This is in addition to eating a healthy diet and getting regular exercise.  Statins can help you stay healthy. They can also help prevent a heart  attack or stroke. You may need to take a statin if you are in one of these groups:    Adults who have had a heart attack or stroke. Or adults who have had peripheral vascular disease, a ministroke (transient ischemic attack), or stable or unstable angina. This group also includes people who have had a procedure to restore blood flow through a blocked artery. These procedures include percutaneous coronary intervention, angioplasty, stent, and open-heart bypass surgery.    Adults who have diabetes. Or adults who are at higher risk of having a heart attack or stroke and have an LDL cholesterol level of 70 to 189 mg/dL    Adults who are 21 years old or older and have an LDL cholesterol level of 190 mg/dL or higher.  If you are in a high-risk group, talk with your healthcare provider about your treatment goals. Make sure you understand why these goals are important, based on your own health history and your family history of heart disease or high cholesterol.  Make a plan to have regular cholesterol checks. You may need to fast before getting this test. Also ask your provider about any side effects your medicines may cause. Let your provider know about any side effects you have. You may need to take more than one medicine to reach the cholesterol goals that you and your provider decide on.  Date Last Reviewed: 10/1/2016    3936-3351 [employer]. [address] [place] [address], [place] [address]. All rights reserved. This information is not intended as a substitute for professional medical care. Always follow your healthcare professional's instructions.

## 2018-09-05 NOTE — NURSING NOTE
"Chief Complaint   Patient presents with     Physical     routine       Initial /80 (BP Location: Right arm)  Pulse 88  Temp 97.3  F (36.3  C) (Tympanic)  Ht 5' 2.5\" (1.588 m)  Wt 197 lb (89.4 kg)  LMP 08/20/2018  SpO2 99%  BMI 35.46 kg/m2 Estimated body mass index is 35.46 kg/(m^2) as calculated from the following:    Height as of this encounter: 5' 2.5\" (1.588 m).    Weight as of this encounter: 197 lb (89.4 kg).      Health Maintenance that is potentially due pending provider review:  NONE    n/a    Is there anyone who you would like to be able to receive your results? No  If yes have patient fill out DAVID    "

## 2018-09-05 NOTE — PATIENT INSTRUCTIONS
Preventive Health Recommendations  Female Ages 26 - 39  Yearly exam:   See your health care provider every year in order to    Review health changes.     Discuss preventive care.      Review your medicines if you your doctor has prescribed any.    Until age 30: Get a Pap test every three years (more often if you have had an abnormal result).    After age 30: Talk to your doctor about whether you should have a Pap test every 3 years or have a Pap test with HPV screening every 5 years.   You do not need a Pap test if your uterus was removed (hysterectomy) and you have not had cancer.  You should be tested each year for STDs (sexually transmitted diseases), if you're at risk.   Talk to your provider about how often to have your cholesterol checked.  If you are at risk for diabetes, you should have a diabetes test (fasting glucose).  Shots: Get a flu shot each year. Get a tetanus shot every 10 years.   Nutrition:     Eat at least 5 servings of fruits and vegetables each day.    Eat whole-grain bread, whole-wheat pasta and brown rice instead of white grains and rice.    Get adequate Calcium and Vitamin D.     Lifestyle    Exercise at least 150 minutes a week (30 minutes a day, 5 days of the week). This will help you control your weight and prevent disease.    Limit alcohol to one drink per day.    No smoking.     Wear sunscreen to prevent skin cancer.    See your dentist every six months for an exam and cleaning.  [facility] Mammo Schedule    [facility] ~ [phone]  One day weekly-Alternating Days    [place] ~ [phone]  Every other Monday or Wednesday &  One Saturday morning a month    [place] ~ [phone]  Every Other Monday afternoon     [place] ~[phone]  Every other Monday morning    [place] ~ [phone]  Every Monday Morning  Every Tuesday Afternoon   Wed, Thurs, Friday morning and afternoon    Prevention Guidelines, Women Ages 18 to 39  Screening tests and vaccines are an important part  of managing your health. A screening test is done to find possible disorders or diseases in people who don't have any symptoms. The goal is to find a disease early so lifestyle changes can be made and you can be watched more closely to reduce the risk of disease, or to detect it early enough to treat it most effectively. Screening tests are not considered diagnostic, but are used to determine if more testing is needed. Health counseling is essential, too. Below are guidelines for these, for women ages 18 to 39. Talk with your healthcare provider to make sure you re up-to-date on what you need.  Screening Who needs it How often   Alcohol misuse All women in this age group At routine exams   Blood pressure All women in this age group Yearly checkup if your blood pressure is normal  Normal blood pressure is less than 120/80 mm Hg  If your blood pressure reading is higher than normal, follow the advice of your healthcare provider   Breast cancer All women in this age group should talk with their healthcare providers about the need for clinical breast exams (CBE)1 Clinical breast exam every 3 years1   Cervical cancer Women ages 21 and older Women between ages 21 and 29 should have a Pap test every 3 years; women between ages 30 and 65 are advised to have a Pap test plus an HPV test every 5 years   Chlamydia Sexually active women ages 24 and younger, and women at increased risk for infection Every 3 years if you're at risk or have symptoms   Depression All women in this age group At routine exams   Type 2 diabates, prediabetes All women with no symptoms who are overweight or obese and have 1 or more other risk factors for diabetes At least every 3 years. Also, testing for diabetes during pregnancy after the 24th week.    Type 2 diabetes, prediabetes All women diagnosed with gestational diabetes Lifelong testing every 3 years   Type 2 diabetes All women with prediabetes Every year   Gonorrhea Sexually active women at  increased risk for infection At routine exams   Hepatitis C Anyone at increased risk At routine exams   HIV All women should be tested at least once for HIV between the ages of 13 and 64 At routine exams. Those with risk factors for HIV should be tested at least annually.    Obesity All women in this age group At routine exams   Syphilis Women at increased risk for infection should talk with their healthcare provider At routine exams   Tuberculosis Women at increased risk for infection should talk with their healthcare provider Ask your healthcare provider   Vision All women in this age group At least 1 complete exam in your 20s, and 2 in your 30s   Vaccine2 Who needs it How often   Chickenpox (varicella) All women in this age group who have no record of this infection or vaccine 2 doses; the second dose should be given 4 to 8 weeks after the first dose   Hepatitis A Women at increased risk for infection should talk with their healthcare provider 2 doses given at least 6 months apart   Hepatitis B Women at increased risk for infection should talk with their healthcare provider 3 doses over 6 months; second dose should be given 1 month after the first dose; the third dose should be given at least 2 months after the second dose and at least 4 months after the first dose   Haemophilus influenzae Type B (HIB) Women at increased risk for infection should talk with their healthcare provider 1 to 3 doses   Human papillomavirus (HPV) All women in this age group up to age 26 3 doses; the second dose should be given 1 to 2 months after the first dose and the third dose given 6 months after the first dose   Influenza (flu) All women in this age group Once a year   Measles, mumps, rubella (MMR) All women in this age group who have no record of these infections or vaccines 1 or 2 doses   Meningococcal Women at increased risk for infection should talk with their healthcare provider 1 or more doses   Pneumococcal conjugate vaccine  (PCV13) and pneumococcal polysaccharide vaccine (PPSV23) Women at increased risk for infection should talk with their healthcare provider PCV13: 1 dose ages 19 to 65 (protects against 13 types of pneumococcal bacteria)  PPSV23: 1 to 2 doses through age 64, or 1 dose at 65 or older (protects against 23 types of pneumococcal bacteria)      Tetanus/diphtheria/pertussis (Td/Tdap) booster All women in this age group Td every 10 years, or a one-time dose of Tdap instead of a Td booster after age 18, then Td every 10 years   Counseling Who needs it How often   BRCA gene mutation testing for breast and ovarian cancer susceptibility Women with increased risk for having gene mutation When your risk is known   Breast cancer and chemoprevention Women at high risk for breast cancer When your risk is known   Diet and exercise Women who are overweight or obese When diagnosed, and then at routine exams   Domestic violence Women at the age in which they are able to have children At routine exams   Sexually transmitted infection prevention Women who are sexually active At routine exams   Skin cancer Prevention of skin cancer in fair-skinned adults At routine exams   Use of tobacco and the health effects it can cause All women in this age group Every visit   1According to the ACS, women ages 20 to 39 years should have a clinical breast exam (CBE) as part of their routine health exam every 3 years. Breast self-exams are an option for women starting in their 20s. But the USPSTF does not recommend CBE.  2Those who are 18 years old and not up-to-date on their childhood vaccines should get all appropriate catch-up vaccines recommended by the CDC.  Date Last Reviewed: 10/1/2017    9493-5240 [employer] [address] [place] [address], [place] [address]. All rights reserved. This information is not intended as a substitute for professional medical care. Always follow your healthcare professional's instructions.        Etonogestrel  implant  Brand Name: Nexplanon  What is this medicine?  ETONOGESTREL (et oh rc CHANEL trel) is a contraceptive (birth control) device. It is used to prevent pregnancy. It can be used for up to 3 years.  How should I use this medicine?  This device is inserted just under the skin on the inner side of your upper arm by a health care professional.  Talk to your pediatrician regarding the use of this medicine in children. Special care may be needed.  What side effects may I notice from receiving this medicine?  Side effects that you should report to your doctor or health care professional as soon as possible:    allergic reactions like skin rash, itching or hives, swelling of the face, lips, or tongue    breast lumps    changes in emotions or moods    depressed mood    heavy or prolonged menstrual bleeding    pain, irritation, swelling, or bruising at the insertion site    scar at site of insertion    signs of infection at the insertion site such as fever, and skin redness, pain or discharge    signs of pregnancy    signs and symptoms of a blood clot such as breathing problems; changes in vision; chest pain; severe, sudden headache; pain, swelling, warmth in the leg; trouble speaking; sudden numbness or weakness of the face, arm or leg    signs and symptoms of liver injury like dark yellow or brown urine; general ill feeling or flu-like symptoms; light-colored stools; loss of appetite; nausea; right upper belly pain; unusually weak or tired; yellowing of the eyes or skin    unusual vaginal bleeding, discharge    signs and symptoms of a stroke like changes in vision; confusion; trouble speaking or understanding; severe headaches; sudden numbness or weakness of the face, arm or leg; trouble walking; dizziness; loss of balance or coordination  Side effects that usually do not require medical attention (report to your doctor or health care professional if they continue or are bothersome):    acne    back pain    breast  pain    changes in weight    dizziness    general ill feeling or flu-like symptoms    headache    irregular menstrual bleeding    nausea    sore throat    vaginal irritation or inflammation  What may interact with this medicine?  Do not take this medicine with any of the following medications:    amprenavir    bosentan    fosamprenavir  This medicine may also interact with the following medications:    barbiturate medicines for inducing sleep or treating seizures    certain medicines for fungal infections like ketoconazole and itraconazole    grapefruit juice    griseofulvin    medicines to treat seizures like carbamazepine, felbamate, oxcarbazepine, phenytoin, topiramate    modafinil    phenylbutazone    rifampin    rufinamide    some medicines to treat HIV infection like atazanavir, indinavir, lopinavir, nelfinavir, tipranavir, ritonavir    [place]'s wort  What if I miss a dose?  This does not apply.  Where should I keep my medicine?  This drug is given in a hospital or clinic and will not be stored at home.  What should I tell my health care provider before I take this medicine?  They need to know if you have any of these conditions:    abnormal vaginal bleeding    blood vessel disease or blood clots    cancer of the breast, cervix, or liver    depression    diabetes    gallbladder disease    headaches    heart disease or recent heart attack    high blood pressure    high cholesterol    kidney disease    liver disease    renal disease    seizures    tobacco smoker    an unusual or allergic reaction to etonogestrel, other hormones, anesthetics or antiseptics, medicines, foods, dyes, or preservatives    pregnant or trying to get pregnant    breast-feeding  What should I watch for while using this medicine?  This product does not protect you against HIV infection (AIDS) or other sexually transmitted diseases.  You should be able to feel the implant by pressing your fingertips over the skin where it was inserted.  Contact your doctor if you cannot feel the implant, and use a non-hormonal birth control method (such as condoms) until your doctor confirms that the implant is in place. If you feel that the implant may have broken or become bent while in your arm, contact your healthcare provider.  NOTE:This sheet is a summary. It may not cover all possible information. If you have questions about this medicine, talk to your doctor, pharmacist, or health care provider. Copyright  2018 [employer]

## 2018-09-06 NOTE — PROGRESS NOTES
Please Notify Shy  of test results negative basic panel.   your cholesterol level is elevated at 218 her good cholesterol which is the HDL is at 35 we like to see this above 49 and her LDL is at 149 we like to see that below 100 at this point I would recommend starting a cholesterol medication and working on diet and exercise.  at this point I would start Lipitor 10 mg daily patient in agreement please send in a prescription for Lipitor 10 mg daily 90 day supply and 3 refills and have follow-up in 3-6 months for her cholesterol.      Kristin Tena CNP

## 2018-09-07 LAB
COPATH REPORT: NORMAL
PAP: NORMAL

## 2018-09-08 ENCOUNTER — RADIANT APPOINTMENT (OUTPATIENT)
Dept: MAMMOGRAPHY | Facility: CLINIC | Age: 38
End: 2018-09-08
Payer: COMMERCIAL

## 2018-09-08 DIAGNOSIS — Z12.31 VISIT FOR SCREENING MAMMOGRAM: ICD-10-CM

## 2018-09-08 DIAGNOSIS — Z00.00 ROUTINE GENERAL MEDICAL EXAMINATION AT A HEALTH CARE FACILITY: ICD-10-CM

## 2018-09-08 PROCEDURE — 77067 SCR MAMMO BI INCL CAD: CPT | Mod: TC

## 2018-09-11 LAB
FINAL DIAGNOSIS: NORMAL
HPV HR 12 DNA CVX QL NAA+PROBE: NEGATIVE
HPV16 DNA SPEC QL NAA+PROBE: NEGATIVE
HPV18 DNA SPEC QL NAA+PROBE: NEGATIVE
SPECIMEN DESCRIPTION: NORMAL
SPECIMEN SOURCE CVX/VAG CYTO: NORMAL

## 2018-09-11 NOTE — PROGRESS NOTES
Please Notify Shy  of test results negative mammogram to have annual mammograms     Kristin Tena CNP

## 2018-10-23 ENCOUNTER — OFFICE VISIT (OUTPATIENT)
Dept: URGENT CARE | Facility: URGENT CARE | Age: 38
End: 2018-10-23
Payer: COMMERCIAL

## 2018-10-23 VITALS
WEIGHT: 190.2 LBS | TEMPERATURE: 100.2 F | DIASTOLIC BLOOD PRESSURE: 70 MMHG | SYSTOLIC BLOOD PRESSURE: 112 MMHG | BODY MASS INDEX: 34.23 KG/M2 | RESPIRATION RATE: 16 BRPM | HEART RATE: 68 BPM

## 2018-10-23 DIAGNOSIS — J02.9 SORE THROAT: ICD-10-CM

## 2018-10-23 DIAGNOSIS — R50.9 FEVER, UNSPECIFIED FEVER CAUSE: ICD-10-CM

## 2018-10-23 DIAGNOSIS — J03.90 TONSILLITIS: Primary | ICD-10-CM

## 2018-10-23 LAB
BASOPHILS # BLD AUTO: 0 10E9/L (ref 0–0.2)
BASOPHILS NFR BLD AUTO: 0.1 %
DEPRECATED S PYO AG THROAT QL EIA: NORMAL
DIFFERENTIAL METHOD BLD: NORMAL
EOSINOPHIL # BLD AUTO: 0 10E9/L (ref 0–0.7)
EOSINOPHIL NFR BLD AUTO: 0.2 %
ERYTHROCYTE [DISTWIDTH] IN BLOOD BY AUTOMATED COUNT: 12.7 % (ref 10–15)
FLUAV+FLUBV AG SPEC QL: NEGATIVE
FLUAV+FLUBV AG SPEC QL: NEGATIVE
HCT VFR BLD AUTO: 40 % (ref 35–47)
HGB BLD-MCNC: 13.5 G/DL (ref 11.7–15.7)
LYMPHOCYTES # BLD AUTO: 1.4 10E9/L (ref 0.8–5.3)
LYMPHOCYTES NFR BLD AUTO: 16.6 %
MCH RBC QN AUTO: 30.8 PG (ref 26.5–33)
MCHC RBC AUTO-ENTMCNC: 33.8 G/DL (ref 31.5–36.5)
MCV RBC AUTO: 91 FL (ref 78–100)
MONOCYTES # BLD AUTO: 0.9 10E9/L (ref 0–1.3)
MONOCYTES NFR BLD AUTO: 10.8 %
NEUTROPHILS # BLD AUTO: 6.3 10E9/L (ref 1.6–8.3)
NEUTROPHILS NFR BLD AUTO: 72.3 %
PLATELET # BLD AUTO: 207 10E9/L (ref 150–450)
RBC # BLD AUTO: 4.39 10E12/L (ref 3.8–5.2)
SPECIMEN SOURCE: NORMAL
SPECIMEN SOURCE: NORMAL
WBC # BLD AUTO: 8.7 10E9/L (ref 4–11)

## 2018-10-23 PROCEDURE — 99213 OFFICE O/P EST LOW 20 MIN: CPT | Performed by: PHYSICIAN ASSISTANT

## 2018-10-23 PROCEDURE — 87880 STREP A ASSAY W/OPTIC: CPT | Performed by: PHYSICIAN ASSISTANT

## 2018-10-23 PROCEDURE — 36415 COLL VENOUS BLD VENIPUNCTURE: CPT | Performed by: PHYSICIAN ASSISTANT

## 2018-10-23 PROCEDURE — 85025 COMPLETE CBC W/AUTO DIFF WBC: CPT | Performed by: PHYSICIAN ASSISTANT

## 2018-10-23 PROCEDURE — 87081 CULTURE SCREEN ONLY: CPT | Performed by: PHYSICIAN ASSISTANT

## 2018-10-23 PROCEDURE — 87804 INFLUENZA ASSAY W/OPTIC: CPT | Performed by: PHYSICIAN ASSISTANT

## 2018-10-23 ASSESSMENT — ENCOUNTER SYMPTOMS
SORE THROAT: 1
RHINORRHEA: 0
UNEXPECTED WEIGHT CHANGE: 0
ARTHRALGIAS: 0
VOMITING: 0
SHORTNESS OF BREATH: 0
MYALGIAS: 0
EYE REDNESS: 0
DIARRHEA: 0
WHEEZING: 0
CHILLS: 0
BACK PAIN: 0
ABDOMINAL PAIN: 0
FATIGUE: 0
NAUSEA: 0
EYE PAIN: 0
CHEST TIGHTNESS: 0
COUGH: 0
PALPITATIONS: 0
SINUS PRESSURE: 0
FEVER: 1
TROUBLE SWALLOWING: 0

## 2018-10-23 NOTE — NURSING NOTE
"Chief Complaint   Patient presents with     Pharyngitis     started Friday.  Getting worse.  Headache, fever, and body aches.  Ibuprofen/ Tylenol        Initial /70 (BP Location: Right arm, Cuff Size: Adult Regular)  Pulse 68  Temp 100.2  F (37.9  C) (Tympanic)  Resp 16  Wt 194 lb 3.2 oz (88.1 kg)  BMI 34.95 kg/m2 Estimated body mass index is 34.95 kg/(m^2) as calculated from the following:    Height as of 9/5/18: 5' 2.5\" (1.588 m).    Weight as of this encounter: 194 lb 3.2 oz (88.1 kg).    Patient presents to the clinic using No DME    Health Maintenance that is potentially due pending provider review:  NONE    n/a    Is there anyone who you would like to be able to receive your results? Not Applicable  If yes have patient fill out DAVID  Mimi Bagley M.A.      "

## 2018-10-23 NOTE — MR AVS SNAPSHOT
After Visit Summary   10/23/2018    Shy Mcfarlane    MRN: [id number]           Patient Information     Date Of Birth          1980        Visit Information        Provider Department      10/23/2018 6:40 PM Beatrice Lowery PA-C [facility]        Today's Diagnoses     Tonsillitis    -  1    Sore throat        Fever, unspecified fever cause           Follow-ups after your visit        Follow-up notes from your care team     Return if symptoms worsen or fail to improve.      Who to contact     If you have questions or need follow up information about today's clinic visit or your schedule please contact [facility] directly at [phone].  Normal or non-critical lab and imaging results will be communicated to you by MyChart, letter or phone within 4 business days after the clinic has received the results. If you do not hear from us within 7 days, please contact the clinic through MyChart or phone. If you have a critical or abnormal lab result, we will notify you by phone as soon as possible.  Submit refill requests through [employer] or call your pharmacy and they will forward the refill request to us. Please allow 3 business days for your refill to be completed.          Additional Information About Your Visit        Care EveryWhere ID     This is your Care EveryWhere ID. This could be used by other organizations to access your [facility] medical records  [id number]        Your Vitals Were     Pulse Temperature Respirations BMI (Body Mass Index)          68 100.2  F (37.9  C) (Tympanic) 16 34.23 kg/m2         Blood Pressure from Last 3 Encounters:   10/23/18 112/70   09/05/18 118/80   03/07/17 104/68    Weight from Last 3 Encounters:   10/23/18 190 lb 3.2 oz (86.3 kg)   09/05/18 197 lb (89.4 kg)   03/07/17 203 lb (92.1 kg)              We Performed the Following     Beta strep group A culture     CBC with platelets differential      Influenza A/B antigen     Strep, Rapid Screen          Today's Medication Changes          These changes are accurate as of 10/23/18 11:59 PM.  If you have any questions, ask your nurse or doctor.               Start taking these medicines.        Dose/Directions    amoxicillin-clavulanate 875-125 MG per tablet   Commonly known as:  AUGMENTIN   Used for:  Tonsillitis   Started by:  Beatrice Lowery PA-C        Dose:  1 tablet   Take 1 tablet by mouth 2 times daily for 10 days   Quantity:  20 tablet   Refills:  0            Where to get your medicines      These medications were sent to [id number] [address]  [address] [address]    Hours:  Closed 10-16-08 business to [facility] Phone:  [phone]     amoxicillin-clavulanate 875-125 MG per tablet                Primary Care Provider Office Phone # Fax #    Laurence Nesbitt, APRN [phone] [id number] [facility] [address]        Equal Access to Services     ALEXA MILLAN : Hadii emily velázquez hadasho Soomaali, waaxda luqadaha, qaybta kaalmada adeegyada, herminio khoury . So [facility] [phone].    ATENCIÓN: Si habla español, tiene a sierra disposición servicios gratuitos de asistencia lingüística. Llame al [phone].    We comply with applicable federal civil rights laws and [place] laws. We do not discriminate on the basis of race, color, national origin, age, disability, sex, sexual orientation, or gender identity.            Thank you!     Thank you for choosing [facility]  for your care. Our goal is always to provide you with excellent care. Hearing back from our patients is one way we can continue to improve our services. Please take a few minutes to complete the written survey that you may receive in the mail after your visit with us. Thank you!             Your Updated Medication List - Protect others around you:  Learn how to safely use, store and throw away your medicines at www.disposemymeds.org.          This list is accurate as of 10/23/18 11:59 PM.  Always use your most recent med list.                   Brand Name Dispense Instructions for use Diagnosis    amoxicillin-clavulanate 875-125 MG per tablet    AUGMENTIN    20 tablet    Take 1 tablet by mouth 2 times daily for 10 days    Tonsillitis       etonogestrel 68 MG Impl    IMPLANON/NEXPLANON    1 each    1 each (68 mg) by Subdermal route once for 1 dose    Nexplanon insertion, Nexplanon removal

## 2018-10-24 LAB
BACTERIA SPEC CULT: NORMAL
SPECIMEN SOURCE: NORMAL

## 2018-10-24 NOTE — PROGRESS NOTES
SUBJECTIVE:   Shy Mcfarlane is a 38 year old female presenting with a chief complaint of   Chief Complaint   Patient presents with     Pharyngitis     started Friday.  Getting worse.  Headache, fever, and body aches.  Ibuprofen/ Tylenol        She is an established patient of [facility].    URI Adult    Onset of symptoms was 4 day(s) ago.  Course of illness is same.    Severity moderate  Current and Associated symptoms: fever and sore throat  Treatment measures tried include Tylenol/Ibuprofen.  Predisposing factors include None.    Review of Systems   Constitutional: Positive for fever. Negative for chills, fatigue and unexpected weight change.   HENT: Positive for sore throat. Negative for congestion, ear pain, postnasal drip, rhinorrhea, sinus pressure and trouble swallowing.    Eyes: Negative for pain, redness and visual disturbance.   Respiratory: Negative for cough, chest tightness, shortness of breath and wheezing.    Cardiovascular: Negative for chest pain and palpitations.   Gastrointestinal: Negative for abdominal pain, diarrhea, nausea and vomiting.   Musculoskeletal: Negative for arthralgias, back pain and myalgias.   Skin: Negative for rash.       Past Medical History:   Diagnosis Date     Skin cancer      Family History   Problem Relation Age of Onset     Cerebrovascular Disease Father      Cancer Paternal Grandmother      Lung Cancer Paternal Grandfather      Current Outpatient Prescriptions   Medication Sig Dispense Refill     amoxicillin-clavulanate (AUGMENTIN) 875-125 MG per tablet Take 1 tablet by mouth 2 times daily for 10 days 20 tablet 0     etonogestrel (IMPLANON/NEXPLANON) 68 MG IMPL 1 each (68 mg) by Subdermal route once for 1 dose 1 each 0     Social History   Substance Use Topics     Smoking status: Former Smoker     Smokeless tobacco: Never Used     Alcohol use No       OBJECTIVE  /70 (BP Location: Right arm, Cuff Size: Adult Regular)  Pulse 68  Temp 100.2  F (37.9  C) (Tympanic)   Resp 16  Wt 190 lb 3.2 oz (86.3 kg)  BMI 34.23 kg/m2    Physical Exam   Constitutional: She appears well-developed and well-nourished.   HENT:   Head: Normocephalic.   Right Ear: Tympanic membrane and ear canal normal.   Left Ear: Tympanic membrane and ear canal normal.   Throat and tonsils are very red, no exudates    Eyes: Conjunctivae are normal. Pupils are equal, round, and reactive to light.   Cardiovascular: Normal rate and normal heart sounds.    Pulmonary/Chest: Effort normal and breath sounds normal.   Skin: Skin is warm and dry. No rash noted.   Psychiatric: She has a normal mood and affect. Her behavior is normal.       Labs:  No results found for this or any previous visit (from the past 24 hour(s)).    X-Ray was not done.    ASSESSMENT:      ICD-10-CM    1. Tonsillitis J03.90 amoxicillin-clavulanate (AUGMENTIN) 875-125 MG per tablet   2. Sore throat J02.9 Strep, Rapid Screen     Beta strep group A culture   3. Fever, unspecified fever cause R50.9 Influenza A/B antigen     CBC with platelets differential     Beta strep group A culture        Medical Decision Making:    Differential Diagnosis:  URI Adult/Peds:  Strep pharyngitis, Tonsilitis, Viral pharyngitis, Viral syndrome and Viral upper respiratory illness    Serious Comorbid Conditions:  Adult:  None    PLAN:    Concerned for bacterial process, will treat with Augmentin x 10 days. Continue with supportive care. Return to clinic if symptoms worsen or do not improve; otherwise follow up as needed      Followup:    If not improving or if condition worsens, follow up with your Primary Care Provider    There are no Patient Instructions on file for this visit.

## 2020-02-06 ENCOUNTER — HOSPITAL ENCOUNTER (EMERGENCY)
Facility: CLINIC | Age: 40
Discharge: HOME | End: 2020-02-06
Attending: FAMILY MEDICINE | Admitting: FAMILY MEDICINE
Payer: COMMERCIAL

## 2020-02-06 ENCOUNTER — NURSE TRIAGE (OUTPATIENT)
Dept: NURSING | Facility: CLINIC | Age: 40
End: 2020-02-06

## 2020-02-06 VITALS
TEMPERATURE: 97.7 F | DIASTOLIC BLOOD PRESSURE: 88 MMHG | SYSTOLIC BLOOD PRESSURE: 135 MMHG | BODY MASS INDEX: 36.35 KG/M2 | RESPIRATION RATE: 18 BRPM | HEART RATE: 87 BPM | HEIGHT: 62 IN | WEIGHT: 197.53 LBS | OXYGEN SATURATION: 99 %

## 2020-02-06 DIAGNOSIS — S61.452A CAT BITE OF LEFT HAND, INITIAL ENCOUNTER: ICD-10-CM
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
## 2020-02-06 PROCEDURE — 99284 EMERGENCY DEPT VISIT MOD MDM: CPT | Mod: Z6 | Performed by: FAMILY MEDICINE

## 2020-02-06 PROCEDURE — 99282 EMERGENCY DEPT VISIT SF MDM: CPT | Performed by: FAMILY MEDICINE

## 2020-02-06 ASSESSMENT — MIFFLIN-ST. JEOR: SCORE: 1524.25

## 2020-02-06 NOTE — ED AVS SNAPSHOT
[facility]  [address] [address]  Phone:  [phone]  Fax:  [phone]                                    Shy Mcfarlane   MRN: [id number]    Department:  [facility]   Date of Visit:  2/6/2020           After Visit Summary Signature Page    I have received my discharge instructions, and my questions have been answered. I have discussed any challenges I see with this plan with the nurse or doctor.    ..........................................................................................................................................  Patient/Patient Representative Signature      ..........................................................................................................................................  Patient Representative Print Name and Relationship to Patient    ..................................................               ................................................  Date                                   Time    ..........................................................................................................................................  Reviewed by Signature/Title    ...................................................              ..............................................  Date                                               Time          22EPIC Rev 08/18

## 2020-02-07 NOTE — TELEPHONE ENCOUNTER
"Shy suffered cat bite with two teeth puncturing her skin today.  Now hand weeping fluid, has a \"distinct ring\" around one puncture site and reports a swollen joint.  Triaged to ED as UC is closed.        Reason for Disposition    [1] Puncture wound (hole through the skin) from claws or teeth AND [2] cat    Additional Information    Negative: [1] Any break in skin (e.g., cut, puncture or scratch) AND[2] wild animal at risk for RABIES (e.g., bat, raccoon, funk, skunk, coyote, other carnivores)    Negative: [1] Any break in skin (e.g., cut, puncture or scratch) AND[2] dog, cat, or ferret at risk for RABIES (e.g., sick, stray, unprovoked bite, developing country)    Negative: [1] Any break in skin (e.g., cut, puncture or scratch) AND[2] monkey    Negative: [1] Cut (length > 1/8 inch or 3 mm) or skin tear AND[2] any animal    Negative: [1] Bleeding AND [2] won't stop after 10 minutes of direct pressure (using correct technique)    Negative: Description of bite sounds severe to the triager    Negative: [1] Non-bite body fluid contact (e.g., saliva, brain) AND [2] onto open cut/wound or mucous membranes AND[3] animal at high-risk for RABIES (e.g., bat, raccoon, funk, skunk, coyote, other carnivores)    Protocols used: ANIMAL BITE-A-AH      "

## 2020-02-07 NOTE — ED PROVIDER NOTES
"  HPI   The patient is a 40 yo female presenting with concerns about a cat bite.  She was working at the [employer] when 1 of the cats bit her in the left hand.  The bite involved her left thenar eminence.  There was a puncture wound created on the dorsal aspect and then a smaller puncture wound on the palmar aspect.  No foreign body sensation.  Mild drainage from the larger wound has been seen.  She describes swelling and pain with movement at the site of injury.  She denies loss of flexion or extension of the thumb.  She denies loss of sensation.  No other injury reported.        Allergies:  No Known Allergies  Problem List:    There are no active problems to display for this patient.     Past Medical History:    Past Medical History:   Diagnosis Date     Skin cancer      Past Surgical History:    History reviewed. No pertinent surgical history.  Family History:    Family History   Problem Relation Age of Onset     Cerebrovascular Disease Father      Cancer Paternal Grandmother      Lung Cancer Paternal Grandfather      Social History:  Marital Status:   [2]  Social History     Tobacco Use     Smoking status: Former Smoker     Smokeless tobacco: Never Used   Substance Use Topics     Alcohol use: No     Drug use: No      Medications:    amoxicillin-clavulanate (AUGMENTIN) 875-125 MG tablet  etonogestrel (IMPLANON/NEXPLANON) 68 MG IMPL      Review of Systems   All other systems reviewed and are negative.      PE   BP: 135/88  Pulse: 87  Temp: 97.7  F (36.5  C)  Resp: 18  Height: 157.5 cm (5' 2\")  Weight: 89.6 kg (197 lb 8.5 oz)  SpO2: 99 %  Physical Exam  Vitals signs reviewed.   Constitutional:       General: She is not in acute distress.     Appearance: She is well-developed.   HENT:      Head: Normocephalic and atraumatic.   Eyes:      Conjunctiva/sclera: Conjunctivae normal.   Neck:      Musculoskeletal: Normal range of motion.   Cardiovascular:      Rate and Rhythm: Normal rate and regular rhythm. "   Pulmonary:      Effort: Pulmonary effort is normal.   Musculoskeletal: Normal range of motion.   Skin:     General: Skin is warm and dry.      Comments: There is evidence of a cat bite involving the left thenar eminence.  There is a larger puncture wound on the dorsum of her hand and then a smaller puncture wound on the palmar aspect.  No foreign body appreciated with palpation or visualization.  Mild clear drainage from the puncture wound on the dorsal hand.  Tenderness throughout.  Local swelling.  She has full extension and flexion, adduction and abduction of the thumb.   Neurological:      Mental Status: She is alert and oriented to person, place, and time.   Psychiatric:         Behavior: Behavior normal.         ED COURSE and [facility]   2246.  The patient has a recent cat bite.  I will cover with Augmentin for the potential of infection.  Follow-up discussed.  Return for worsening.    LABS  Labs Ordered and Resulted from Time of ED Arrival Up to the Time of Departure from the ED - No data to display    IMAGING  Images reviewed by me.  Radiology report also reviewed.  No orders to display       Procedures    Medications - No data to display      IMPRESSION       ICD-10-CM    1. Cat bite of left hand, initial encounter S61.452A     W55.01XA             Medication List      Started    amoxicillin-clavulanate 875-125 MG tablet  Commonly known as:  Augmentin  1 tablet, Oral, 2 TIMES DAILY                          Taco Morocho MD  02/06/20 [id number]

## 2020-02-07 NOTE — DISCHARGE INSTRUCTIONS
Return to the Emergency Room if the following occurs:     Severely worsened pain, expanding redness and swelling, fever >101, or for any concern at anytime.    Or, follow-up with the following provider as we discussed:     Return to your primary doctor as needed, or if not improved over the next 5 days.    Medications discussed:    Ibuprofen 600 mg every six hours for pain (7 days duration).  Tylenol 1000 mg every six hours for pain (7 days duration).  Therefore, you can alternate these every three hours and do it safely.  Augmentin.    If you received pain-relieving or sedating medication during your time in the ER, avoid alcohol, driving automobiles, or working with machinery.  Also, a responsible adult must stay with you.        Call the Nurse Advice Line at [phone] or [phone] for any concern at anytime.

## 2020-02-27 ENCOUNTER — IMMUNIZATION (OUTPATIENT)
Dept: FAMILY MEDICINE | Facility: CLINIC | Age: 40
End: 2020-02-27
Payer: COMMERCIAL

## 2020-02-27 DIAGNOSIS — Z23 NEED FOR PROPHYLACTIC VACCINATION AND INOCULATION AGAINST INFLUENZA: Primary | ICD-10-CM

## 2020-02-27 PROCEDURE — 99207 ZZC NO CHARGE NURSE ONLY: CPT

## 2020-02-27 PROCEDURE — 90686 IIV4 VACC NO PRSV 0.5 ML IM: CPT

## 2020-02-27 PROCEDURE — 90471 IMMUNIZATION ADMIN: CPT

## 2020-02-27 NOTE — NURSING NOTE
Chief Complaint   Patient presents with     Imm/Inj     Flu Shot      No Known Allergies    Immunization History   Administered Date(s) Administered     Influenza Vaccine IM > 6 months Valent IIV4 2020     Verified patient w/ last name and  prior to injection. VIS's given for review. Advised to remain in the clinic for 15-20 minutes after injection and to report any adverse reactions immediately.     Yesi Mercado MA

## 2021-05-09 ENCOUNTER — HEALTH MAINTENANCE LETTER (OUTPATIENT)
Age: 41
End: 2021-05-09

## 2021-06-04 ENCOUNTER — OFFICE VISIT (OUTPATIENT)
Dept: FAMILY MEDICINE | Facility: CLINIC | Age: 41
End: 2021-06-04
Payer: COMMERCIAL

## 2021-06-04 VITALS
BODY MASS INDEX: 37.13 KG/M2 | OXYGEN SATURATION: 99 % | HEART RATE: 88 BPM | DIASTOLIC BLOOD PRESSURE: 72 MMHG | RESPIRATION RATE: 24 BRPM | TEMPERATURE: 98.9 F | SYSTOLIC BLOOD PRESSURE: 126 MMHG | WEIGHT: 203 LBS

## 2021-06-04 DIAGNOSIS — R19.7 DIARRHEA, UNSPECIFIED TYPE: Primary | ICD-10-CM

## 2021-06-04 DIAGNOSIS — F51.02 ADJUSTMENT INSOMNIA: ICD-10-CM

## 2021-06-04 LAB
ALBUMIN SERPL-MCNC: 4 G/DL (ref 3.4–5)
ALP SERPL-CCNC: 68 U/L (ref 40–150)
ALT SERPL W P-5'-P-CCNC: 29 U/L (ref 0–50)
ANION GAP SERPL CALCULATED.3IONS-SCNC: 7 MMOL/L (ref 3–14)
AST SERPL W P-5'-P-CCNC: 20 U/L (ref 0–45)
BASOPHILS # BLD AUTO: 0 10E9/L (ref 0–0.2)
BASOPHILS NFR BLD AUTO: 0.2 %
BILIRUB SERPL-MCNC: 0.4 MG/DL (ref 0.2–1.3)
BUN SERPL-MCNC: 8 MG/DL (ref 7–30)
CALCIUM SERPL-MCNC: 8.9 MG/DL (ref 8.5–10.1)
CHLORIDE SERPL-SCNC: 108 MMOL/L (ref 94–109)
CO2 SERPL-SCNC: 24 MMOL/L (ref 20–32)
CREAT SERPL-MCNC: 0.84 MG/DL (ref 0.52–1.04)
DIFFERENTIAL METHOD BLD: NORMAL
EOSINOPHIL # BLD AUTO: 0 10E9/L (ref 0–0.7)
EOSINOPHIL NFR BLD AUTO: 0.2 %
ERYTHROCYTE [DISTWIDTH] IN BLOOD BY AUTOMATED COUNT: 12.4 % (ref 10–15)
GFR SERPL CREATININE-BSD FRML MDRD: 87 ML/MIN/{1.73_M2}
GLUCOSE SERPL-MCNC: 137 MG/DL (ref 70–99)
HCT VFR BLD AUTO: 40.1 % (ref 35–47)
HGB BLD-MCNC: 13.8 G/DL (ref 11.7–15.7)
LYMPHOCYTES # BLD AUTO: 2.4 10E9/L (ref 0.8–5.3)
LYMPHOCYTES NFR BLD AUTO: 30 %
MCH RBC QN AUTO: 31 PG (ref 26.5–33)
MCHC RBC AUTO-ENTMCNC: 34.4 G/DL (ref 31.5–36.5)
MCV RBC AUTO: 90 FL (ref 78–100)
MONOCYTES # BLD AUTO: 0.6 10E9/L (ref 0–1.3)
MONOCYTES NFR BLD AUTO: 6.9 %
NEUTROPHILS # BLD AUTO: 5 10E9/L (ref 1.6–8.3)
NEUTROPHILS NFR BLD AUTO: 62.7 %
PLATELET # BLD AUTO: 299 10E9/L (ref 150–450)
POTASSIUM SERPL-SCNC: 3.6 MMOL/L (ref 3.4–5.3)
PROT SERPL-MCNC: 7.5 G/DL (ref 6.8–8.8)
RBC # BLD AUTO: 4.45 10E12/L (ref 3.8–5.2)
SODIUM SERPL-SCNC: 139 MMOL/L (ref 133–144)
WBC # BLD AUTO: 8 10E9/L (ref 4–11)

## 2021-06-04 PROCEDURE — 80053 COMPREHEN METABOLIC PANEL: CPT | Performed by: NURSE PRACTITIONER

## 2021-06-04 PROCEDURE — 85025 COMPLETE CBC W/AUTO DIFF WBC: CPT | Performed by: NURSE PRACTITIONER

## 2021-06-04 PROCEDURE — 99214 OFFICE O/P EST MOD 30 MIN: CPT | Performed by: NURSE PRACTITIONER

## 2021-06-04 PROCEDURE — 36415 COLL VENOUS BLD VENIPUNCTURE: CPT | Performed by: NURSE PRACTITIONER

## 2021-06-04 RX ORDER — TRAZODONE HYDROCHLORIDE 50 MG/1
50-100 TABLET, FILM COATED ORAL
Qty: 30 TABLET | Refills: 3 | Status: SHIPPED | OUTPATIENT
Start: 2021-06-04

## 2021-06-04 NOTE — PROGRESS NOTES
Assessment & Plan     Diarrhea, unspecified type  Labs today for further evaluation of cause.  Since blood has only been with wiping and twice in the last week, will wait to see if symptoms subside and improve or if treatment is needed that would reduce diarrhea.  If blood becomes persistent, would recommend colonoscopy.  I discussed with patient that I would follow-up with her on labs once completed and treat as needed.  Anxiety may be cause but patient does not want treatment for this at this time.  Recommend pushing fluids, follow-up if any signs of dehydration or increase in rectal bleeding for further urgent evaluation.  - CBC with platelets and differential  - Comprehensive metabolic panel (BMP + Alb, Alk Phos, ALT, AST, Total. Bili, TP)  - Enteric Bacteria and Virus Panel by [facility] Stool; Future  - Ova and Parasite Exam Routine; Future  - Clostridium difficile Toxin B PCR; Future    Adjustment insomnia  Treating with Trazodone as needed to help with sleep.  Follow-up for refills if needed.  - traZODone (DESYREL) 50 MG tablet; Take 1-2 tablets ( mg) by mouth nightly as needed for sleep    See Patient Instructions    Return in about 1 week (around 6/11/2021), or if symptoms worsen or fail to improve.    Tomeka Smith NP  [facility]    Luz Begum is a 41 year old who presents for the following health issues     HPI     Diarrhea  Onset/Duration: 1 week  Description:       Consistency of stool: green       Blood in stool: YES twice this week has some blood with wiping and feels it is irritation from frequent stools, Denies any known external hemorrhoids.       Number of loose stools past 24 hours: going twice per hour  Progression of Symptoms: waxing and waning  Accompanying signs and symptoms:       Fever: no       Nausea/Vomiting: YES       Abdominal pain: no  cramping       Weight loss: no       Episodes of constipation: no  History   Ill contacts: no  Recent  use of antibiotics: no  Recent travels: no  Recent medication-new or changes(Rx or OTC): no  Precipitating or alleviating factors: possibly related to stress at work  Therapies tried and outcome: gas x-half tab was some help with burping, took pepto today  Pepto taken this morning and slowed down a little this morning  No antibiotic or new medications that have been started.  No rectal lumps, no pain, itching or irritation in the rectum.  Also has indigestion which makes it hard to eat.  Patient states that she feels that stress may be cause but since this has been going on for a week and now a couple times with blood thought it should be checked out.  Patient is asking for something to help her sleep due to stress.  She does have increased anxiety due to job stressors but does not want to be treated for them at this time.    Review of Systems   CONSTITUTIONAL: NEGATIVE for fever, chills, change in weight  RESP: NEGATIVE for significant cough or SOB  CV: NEGATIVE for chest pain, palpitations or peripheral edema  GI: POSITIVE for diarrhea and gas or bloating  HEME/ALLERGY/IMMUNE: NEGATIVE for bleeding problems  PSYCHIATRIC: NEGATIVE for changes in mood or affect      Objective    /72   Pulse 88   Temp 98.9  F (37.2  C)   Resp 24   Wt 92.1 kg (203 lb)   LMP 05/21/2021 (Approximate)   SpO2 99%   BMI 37.13 kg/m    Body mass index is 37.13 kg/m .  Physical Exam   GENERAL: healthy, alert and no distress  RESP: lungs clear to auscultation - no rales, rhonchi or wheezes  CV: regular rate and rhythm, normal S1 S2, no S3 or S4, no murmur, click or rub, no peripheral edema and peripheral pulses strong  ABDOMEN: tenderness RUQ, LUQ, RLQ, LLQ all mild, no organomegaly or masses and bowel sounds normal  PSYCH: mentation appears normal, affect normal/bright

## 2021-06-04 NOTE — PATIENT INSTRUCTIONS
1.  Take Trazodone 1-2 tabs at bedtime for sleep as needed.  2.  Labs today, I will call you with results.  3.  If unable to get stool samples today, turn them in as soon as possible.  4. Once you get stool samples turned in, take imodium for diarrhea.  5.  Follow-up in ER if any worsening symptoms.  6.  Follow-up in clinic if no improvement and increase in rectal bleeding.

## 2021-06-05 DIAGNOSIS — R19.7 DIARRHEA, UNSPECIFIED TYPE: ICD-10-CM

## 2021-06-05 LAB
C DIFF TOX B STL QL: NEGATIVE
SPECIMEN SOURCE: NORMAL

## 2021-06-05 PROCEDURE — 87506 IADNA-DNA/RNA PROBE TQ 6-11: CPT | Performed by: NURSE PRACTITIONER

## 2021-06-05 PROCEDURE — 87209 SMEAR COMPLEX STAIN: CPT | Performed by: NURSE PRACTITIONER

## 2021-06-05 PROCEDURE — 87493 C DIFF AMPLIFIED PROBE: CPT | Mod: 59 | Performed by: NURSE PRACTITIONER

## 2021-06-05 PROCEDURE — 87177 OVA AND PARASITES SMEARS: CPT | Performed by: NURSE PRACTITIONER

## 2021-06-07 LAB
O+P STL MICRO: NORMAL
O+P STL MICRO: NORMAL
SPECIMEN SOURCE: NORMAL

## 2021-06-16 ENCOUNTER — TELEPHONE (OUTPATIENT)
Dept: FAMILY MEDICINE | Facility: CLINIC | Age: 41
End: 2021-06-16

## 2021-08-07 ENCOUNTER — NURSE TRIAGE (OUTPATIENT)
Dept: NURSING | Facility: CLINIC | Age: 41
End: 2021-08-07

## 2021-08-07 NOTE — TELEPHONE ENCOUNTER
Data:  Pt reports they were exposed to their mother-in-law on Tuesday 8/3/21 who later tested positive for COVID-19. Pt has developed symptoms as of yesterday of cough, sore throat, runny nose, congestion and headache. Pt has been fully vaccinated as of 5/6/21. Denies fever or shortness of breath.       Action:   Per protocol advised setting up appt with PCP for COVID testing. Advised virtual [facility] visit if not wanting to wait until Monday. Care advise given. Call transferred to  for virtual [facility] visit.     Response:   Pt verbalizes understanding and agrees with plan of care.    Wiley Fitzpatrick RN 8/7/2021 6:15 PM  [facility] Nurse Advisor       Reason for Disposition    [1] COVID-19 infection suspected by caller or triager AND [2] mild symptoms (cough, fever, or others) AND [3] no complications or SOB    Additional Information    Negative: SEVERE difficulty breathing (e.g., struggling for each breath, speaks in single words)    Negative: Difficult to awaken or acting confused (e.g., disoriented, slurred speech)    Negative: Bluish (or gray) lips or face now    Negative: Shock suspected (e.g., cold/pale/clammy skin, too weak to stand, low BP, rapid pulse)    Negative: Sounds like a life-threatening emergency to the triager    Negative: [1] COVID-19 exposure AND [2] has not completed COVID-19 vaccine series AND [3] no symptoms    Negative: [1] COVID-19 exposure AND [2] completed COVID-19 vaccine series (fully vaccinated) AND [3] no symptoms    Negative: COVID-19 vaccine reaction suspected (e.g., fever, headache, muscle aches) occurring during days 1-3 after getting vaccine    Negative: COVID-19 vaccine, questions about    Negative: [1] COVID-19 vaccine series completed (fully vaccinated) in past 3 months AND [2] new-onset of COVID-19 symptoms BUT [3] no known exposure    Negative: [1] Had lab test confirmed COVID-19 infection within last 3 monthsAND [2] new-onset of COVID-19 symptoms BUT [3] no known  exposure    Negative: [1] Lives with someone known to have influenza (flu test positive) AND [2] flu-like symptoms (e.g., cough, runny nose, sore throat, SOB; with or without fever)    Negative: [1] Adult with possible COVID-19 symptoms AND [2] triager concerned about severity of symptoms or other causes    Negative: COVID-19 and breastfeeding, questions about    Negative: SEVERE or constant chest pain or pressure (Exception: mild central chest pain, present only when coughing)    Negative: MODERATE difficulty breathing (e.g., speaks in phrases, SOB even at rest, pulse 100-120)    Negative: [1] Headache AND [2] stiff neck (can't touch chin to chest)    Negative: MILD difficulty breathing (e.g., minimal/no SOB at rest, SOB with walking, pulse <100)    Negative: Chest pain or pressure    Negative: Patient sounds very sick or weak to the triager    Negative: Fever > 103 F (39.4 C)    Negative: [1] Fever > 101 F (38.3 C) AND [2] age > 60 years    Negative: [1] Fever > 100.0 F (37.8 C) AND [2] bedridden (e.g., nursing home patient, CVA, chronic illness, recovering from surgery)    Negative: [1] HIGH RISK patient (e.g., age > 64 years, diabetes, heart or lung disease, weak immune system) AND [2] new or worsening symptoms    Negative: [1] HIGH RISK patient AND [2] influenza is widespread in the community AND [3] ONE OR MORE respiratory symptoms: cough, sore throat, runny or stuffy nose    Negative: [1] HIGH RISK patient AND [2] influenza exposure within the last 7 days AND [3] ONE OR MORE respiratory symptoms: cough, sore throat, runny or stuffy nose    Negative: Fever present > 3 days (72 hours)    Negative: [1] Fever returns after gone for over 24 hours AND [2] symptoms worse or not improved    Negative: [1] Continuous (nonstop) coughing interferes with work or school AND [2] no improvement using cough treatment per protocol    Protocols used: CORONAVIRUS (COVID-19) DIAGNOSED OR [id number]-H-HC 3.25    COVID 19 Nurse  Triage Plan/Patient Instructions    Please be aware that novel coronavirus (COVID-19) may be circulating in the community. If you develop symptoms such as fever, cough, or SOB or if you have concerns about the presence of another infection including coronavirus (COVID-19), please contact your health care provider or visit [email or web address].     Disposition/Instructions    Virtual Visit with provider recommended. Reference Visit Selection Guide.    Thank you for taking steps to prevent the spread of this virus.  o Limit your contact with others.  o Wear a simple mask to cover your cough.  o Wash your hands well and often.    Resources    [facility]: About COVID-19: [email or web address]    CDC: What to Do If You're Sick: www.cdc.gov/coronavirus/2019-ncov/about/steps-when-sick.html    CDC: Ending Home Isolation: www.cdc.gov/coronavirus/2019-ncov/hcp/disposition-in-home-patients.html     CDC: Caring for Someone: www.cdc.gov/coronavirus/2019-ncov/if-you-are-sick/care-for-someone.html     [facility]: Interim Guidance for Hospital Discharge to Home: www.health.[facility].[place]/diseases/coronavirus/hcp/hospdischarge.pdf    [facility] clinical trials (COVID-19 research studies): [email or web address].[email or web address]-clinical-trials     Below are the COVID-19 hotlines at the [place] Department of Health ([facility]). Interpreters are available.   o For health questions: Call 423-050-6396 or 1-177.287.5909 (7 a.m. to 7 p.m.)  o For questions about schools and childcare: Call 273-496-2383 or 1-604.178.3940 (7 a.m. to 7 p.m.)

## 2021-08-08 ENCOUNTER — VIRTUAL VISIT (OUTPATIENT)
Dept: URGENT CARE | Facility: CLINIC | Age: 41
End: 2021-08-08
Payer: COMMERCIAL

## 2021-08-08 DIAGNOSIS — Z20.822 SUSPECTED COVID-19 VIRUS INFECTION: Primary | ICD-10-CM

## 2021-08-08 DIAGNOSIS — Z20.822 SUSPECTED COVID-19 VIRUS INFECTION: ICD-10-CM

## 2021-08-08 LAB — SARS-COV-2 RNA RESP QL NAA+PROBE: NEGATIVE

## 2021-08-08 PROCEDURE — U0005 INFEC AGEN DETEC AMPLI PROBE: HCPCS

## 2021-08-08 PROCEDURE — U0003 INFECTIOUS AGENT DETECTION BY NUCLEIC ACID (DNA OR RNA); SEVERE ACUTE RESPIRATORY SYNDROME CORONAVIRUS 2 (SARS-COV-2) (CORONAVIRUS DISEASE [COVID-19]), AMPLIFIED PROBE TECHNIQUE, MAKING USE OF HIGH THROUGHPUT TECHNOLOGIES AS DESCRIBED BY CMS-2020-01-R: HCPCS

## 2021-08-08 PROCEDURE — 99213 OFFICE O/P EST LOW 20 MIN: CPT | Mod: TEL

## 2021-08-08 ASSESSMENT — ENCOUNTER SYMPTOMS
FEVER: 0
COUGH: 1
ADENOPATHY: 0
SORE THROAT: 0
HEADACHES: 0
CHILLS: 0
EYE ITCHING: 0
SHORTNESS OF BREATH: 0
DIAPHORESIS: 1
WHEEZING: 0
MYALGIAS: 0
FATIGUE: 1
RHINORRHEA: 1
SINUS PAIN: 0
HEARTBURN: 0
SLEEP DISTURBANCE: 0

## 2021-08-08 NOTE — PATIENT INSTRUCTIONS
COVID test ordered per request  Rest! Your body needs more rest to heal.  Drink plenty of fluids (warm fluids like tea or soup are soothing and reduce cough)  Sit in the bathroom with a hot shower running and breathe in the steam.  Honey may soothe your sore throat and help manage your cough- may take straight or in warm water with lemon juice.  Avoid smoke (cigarettes, bonfires, fireplace, wood burning stoves).  Take Tylenol or an NSAID such as ibuprofen or naproxen as needed for pain.  Delsym (dextromethorphan polistirex) is an over the counter cough medication that lasts 12 hours.   Mucinex or Robitussin (guiafenesin) thin mucus and may help it to loosen more quickly  Good handwashing is the best way to prevent spread of germs  Present to emergency room if you develop trouble breathing, swallowing or cough-up blood.  Follow up with your primary care provider if symptoms worsen or fail to improve as expected.

## 2021-08-08 NOTE — PROGRESS NOTES
Chief Complaint   Patient presents with     Covid 19 Testing     VIRTUAL VISIT    SUBJECTIVE:  Shy Mcfarlane is a 41 year old female who is seeking a covid test. She has had cough, congestion, runny nose, sweats for 3 days. Her kids are sick as well. She is vaccinated.    Past Medical History:   Diagnosis Date     Skin cancer      etonogestrel (IMPLANON/NEXPLANON) 68 MG IMPL, 1 each (68 mg) by Subdermal route once for 1 dose  traZODone (DESYREL) 50 MG tablet, Take 1-2 tablets ( mg) by mouth nightly as needed for sleep    No current facility-administered medications on file prior to visit.    Social History     Tobacco Use     Smoking status: Former Smoker     Smokeless tobacco: Never Used   Substance Use Topics     Alcohol use: No     No Known Allergies    Review of Systems   Constitutional: Positive for diaphoresis and fatigue. Negative for chills and fever.   HENT: Positive for congestion and rhinorrhea. Negative for ear pain, postnasal drip, sinus pain and sore throat.    Eyes: Negative for itching.   Respiratory: Positive for cough. Negative for shortness of breath and wheezing.    Gastrointestinal: Negative for heartburn.   Musculoskeletal: Negative for myalgias.   Allergic/Immunologic: Negative for environmental allergies.   Neurological: Negative for headaches.   Hematological: Negative for adenopathy.   Psychiatric/Behavioral: Negative for sleep disturbance.     There were no vitals taken for this visit.    Physical Exam unable to complete virtually.    ASSESSMENT:    ICD-10-CM    1. Suspected COVID-19 virus infection  Z20.822 Symptomatic COVID-19 Virus (Coronavirus) by PCR Nasopharyngeal     PLAN:   Patient Instructions   COVID test ordered per request  Rest! Your body needs more rest to heal.  Drink plenty of fluids (warm fluids like tea or soup are soothing and reduce cough)  Sit in the bathroom with a hot shower running and breathe in the steam.  Honey may soothe your sore throat and help manage  your cough- may take straight or in warm water with lemon juice.  Avoid smoke (cigarettes, bonfires, fireplace, wood burning stoves).  Take Tylenol or an NSAID such as ibuprofen or naproxen as needed for pain.  Delsym (dextromethorphan polistirex) is an over the counter cough medication that lasts 12 hours.   Mucinex or Robitussin (guiafenesin) thin mucus and may help it to loosen more quickly  Good handwashing is the best way to prevent spread of germs  Present to emergency room if you develop trouble breathing, swallowing or cough-up blood.  Follow up with your primary care provider if symptoms worsen or fail to improve as expected.    Follow up with primary care provider with any problems, questions or concerns or if symptoms worsen or fail to improve. Patient agreed to plan and verbalized understanding.    SAMEER [facility]    Telephone visit 5 minutes, chart time 6 minutes.

## 2021-09-08 ENCOUNTER — TELEPHONE (OUTPATIENT)
Dept: FAMILY MEDICINE | Facility: CLINIC | Age: 41
End: 2021-09-08

## 2021-09-08 NOTE — TELEPHONE ENCOUNTER
Reason for Call:  Other appointment    Detailed comments: pt is scheduled for 60 minutes for a pe and IUD removal & insert on 9/16.Does pt need any instructions before appt? Is this appt date/time OK?  If not please contact pt to [facility]rahan. Pt had a 9a -60 min appt in 2018.    Phone Number Patient can be reached at: Home number on file [phone] (home)    Best Time:     Can we leave a detailed message on this number? YES    Call taken on 9/8/2021 at 9:31 AM by Fanny Joyner

## 2021-09-16 ENCOUNTER — OFFICE VISIT (OUTPATIENT)
Dept: FAMILY MEDICINE | Facility: CLINIC | Age: 41
End: 2021-09-16
Payer: COMMERCIAL

## 2021-09-16 VITALS
TEMPERATURE: 98 F | HEART RATE: 76 BPM | DIASTOLIC BLOOD PRESSURE: 70 MMHG | HEIGHT: 62 IN | SYSTOLIC BLOOD PRESSURE: 114 MMHG | BODY MASS INDEX: 38.46 KG/M2 | WEIGHT: 209 LBS

## 2021-09-16 DIAGNOSIS — Z30.46 NEXPLANON REMOVAL: ICD-10-CM

## 2021-09-16 DIAGNOSIS — Z30.017 NEXPLANON INSERTION: ICD-10-CM

## 2021-09-16 DIAGNOSIS — Z13.1 SCREENING FOR DIABETES MELLITUS: ICD-10-CM

## 2021-09-16 DIAGNOSIS — Z00.00 ROUTINE GENERAL MEDICAL EXAMINATION AT A HEALTH CARE FACILITY: Primary | ICD-10-CM

## 2021-09-16 DIAGNOSIS — Z30.46 ENCOUNTER FOR SURVEILLANCE OF IMPLANTABLE SUBDERMAL CONTRACEPTIVE: ICD-10-CM

## 2021-09-16 PROCEDURE — 11983 REMOVE/INSERT DRUG IMPLANT: CPT | Performed by: NURSE PRACTITIONER

## 2021-09-16 PROCEDURE — 99396 PREV VISIT EST AGE 40-64: CPT | Mod: 25 | Performed by: NURSE PRACTITIONER

## 2021-09-16 ASSESSMENT — ENCOUNTER SYMPTOMS
FREQUENCY: 0
CONSTIPATION: 0
DIZZINESS: 0
DYSURIA: 0
JOINT SWELLING: 0
ARTHRALGIAS: 0
NERVOUS/ANXIOUS: 0
HEARTBURN: 0
CHILLS: 0
HEMATOCHEZIA: 0
SHORTNESS OF BREATH: 0
WEAKNESS: 0
COUGH: 0
FEVER: 0
MYALGIAS: 0
HEMATURIA: 0
PALPITATIONS: 0
NAUSEA: 0
PARESTHESIAS: 0
HEADACHES: 0
SORE THROAT: 0
EYE PAIN: 0
DIARRHEA: 0
ABDOMINAL PAIN: 0

## 2021-09-16 ASSESSMENT — MIFFLIN-ST. JEOR: SCORE: 1566.27

## 2021-09-16 NOTE — PROGRESS NOTES
SUBJECTIVE:   CC: Shy Mcfarlane is an 41 year old woman who presents for preventive health visit.       Patient has been advised of split billing requirements and indicates understanding: Yes  Healthy Habits:     Getting at least 3 servings of Calcium per day:  Yes    Bi-annual eye exam:  Yes    Dental care twice a year:  Yes    Sleep apnea or symptoms of sleep apnea:  Daytime drowsiness    Diet:  Regular (no restrictions)    Frequency of exercise:  None    Taking medications regularly:  No    Medication side effects:  Not applicable    PHQ-2 Total Score: 0    Additional concerns today:  No  Contraception  Pertinent negatives include no abdominal pain, arthralgias, chest pain, chills, congestion, coughing, fever, headaches, joint swelling, myalgias, nausea, rash, sore throat or weakness.   Patient would like to have her nexplanon removed. It was placed on 9/5/2018.        Today's PHQ-2 Score:   PHQ-2 ( 1999 [employer]) 9/16/2021   Q1: Little interest or pleasure in doing things 0   Q2: Feeling down, depressed or hopeless 0   PHQ-2 Score 0   Q1: Little interest or pleasure in doing things Not at all   Q2: Feeling down, depressed or hopeless Not at all   PHQ-2 Score 0       Abuse: Current or Past (Physical, Sexual or Emotional) - No  Do you feel safe in your environment? Yes    Have you ever done Advance Care Planning? (For example, a Health Directive, POLST, or a discussion with a medical provider or your loved ones about your wishes): No, advance care planning information given to patient to review.  Patient declined advance care planning discussion at this time.    Social History     Tobacco Use     Smoking status: Former Smoker     Smokeless tobacco: Never Used   Substance Use Topics     Alcohol use: No     If you drink alcohol do you typically have >3 drinks per day or >7 drinks per week? No    Alcohol Use 9/16/2021   Prescreen: >3 drinks/day or >7 drinks/week? Not Applicable       Reviewed orders with patient.   Reviewed health maintenance and updated orders accordingly - Yes  BP Readings from Last 3 Encounters:   09/16/21 114/70   06/04/21 126/72   02/06/20 135/88    Wt Readings from Last 3 Encounters:   09/16/21 94.8 kg (209 lb)   06/04/21 92.1 kg (203 lb)   02/06/20 89.6 kg (197 lb 8.5 oz)                  There is no problem list on file for this patient.    History reviewed. No pertinent surgical history.    Social History     Tobacco Use     Smoking status: Former Smoker     Smokeless tobacco: Never Used   Substance Use Topics     Alcohol use: No     Family History   Problem Relation Age of Onset     Cerebrovascular Disease Father      Cancer Paternal Grandmother      Lung Cancer Paternal Grandfather          Current Outpatient Medications   Medication Sig Dispense Refill     traZODone (DESYREL) 50 MG tablet Take 1-2 tablets ( mg) by mouth nightly as needed for sleep (Patient not taking: Reported on 9/16/2021) 30 tablet 3     No Known Allergies  Recent Labs   Lab Test 06/04/21  1640 09/05/18  0927   LDL  --  149*   HDL  --  35*   TRIG  --  172*   ALT 29  --    CR 0.84 0.87   GFRESTIMATED 87 72   GFRESTBLACK >90 88   POTASSIUM 3.6 3.8        Breast Cancer Screening:    Breast CA Risk Assessment (FHS-7) 9/16/2021   Do you have a family history of breast, colon, or ovarian cancer? No / Unknown       click delete button to remove this line now  Mammogram Screening - Offered annual screening and updated Health Maintenance for mutual plan based on risk factor consideration    Pertinent mammograms are reviewed under the imaging tab.    History of abnormal Pap smear: NO - age 30- 65 PAP every 3 years recommended  PAP / HPV Latest Ref Rng & Units 9/5/2018   PAP (Historical) - NIL   HPV16 NEG:Negative Negative   HPV18 NEG:Negative Negative   HRHPV NEG:Negative Negative     Reviewed and updated as needed this visit by clinical staff                 Reviewed and updated as needed this visit by Provider                Past  "Medical History:   Diagnosis Date     Skin cancer       History reviewed. No pertinent surgical history.  OB History   No obstetric history on file.       Review of Systems   Constitutional: Negative for chills and fever.   HENT: Negative for congestion, ear pain, hearing loss and sore throat.    Eyes: Negative for pain and visual disturbance.   Respiratory: Negative for cough and shortness of breath.    Cardiovascular: Negative for chest pain, palpitations and peripheral edema.   Gastrointestinal: Negative for abdominal pain, constipation, diarrhea, heartburn, hematochezia and nausea.   Genitourinary: Negative for dysuria, frequency, genital sores, hematuria and urgency.   Musculoskeletal: Negative for arthralgias, joint swelling and myalgias.   Skin: Negative for rash.   Neurological: Negative for dizziness, weakness, headaches and paresthesias.   Psychiatric/Behavioral: Negative for mood changes. The patient is not nervous/anxious.         OBJECTIVE:   /70 (BP Location: Right arm, Cuff Size: Adult Regular)   Pulse 76   Temp 98  F (36.7  C) (Tympanic)   Ht 1.575 m (5' 2\")   Wt 94.8 kg (209 lb)   BMI 38.23 kg/m    Physical Exam  GENERAL: healthy, alert and no distress  EYES: Eyes grossly normal to inspection, PERRL and conjunctivae and sclerae normal  RESP: lungs clear to auscultation - no rales, rhonchi or wheezes  CV: regular rate and rhythm, normal S1 S2, no S3 or S4, no murmur, click or rub, no peripheral edema and peripheral pulses strong  PSYCH: mentation appears normal, affect normal/bright    Diagnostic Test Results:  Labs reviewed in Epic  No results found for any visits on 09/16/21.    ASSESSMENT/PLAN:   (Z00.00) Routine general medical examination at a health care facility  (primary encounter diagnosis)  Comment:   Plan:     (Z30.9) Contraceptive management  Comment:   Plan: CANCELED: UA macro with reflex to Microscopic         and Culture - Clinc Collect, CANCELED: HCG         Qual, Urine " ([id number])      (Z30.017) Nexplanon insertion  Comment:   Plan: etonogestrel (NEXPLANON) subdermal implant 68         mg, REMOVE & REINSERT NON-BIODEGRADABLE DRUG         DELIVERY IMPLANT            (Z30.46) Nexplanon removal  Comment:   Plan: REMOVE & REINSERT NON-BIODEGRADABLE DRUG         DELIVERY IMPLANT            The procedure was explained to the patient and informed Consent was obtained    Left  arm placed above head with the patient in a supine position on the examining bed.  Nexplaonon placed site was identified, cleaned with betadine.  Using a 15 scalpel a 3 mm incision was made, using a small curved atrial forceps, the tip of the nexplaonon as identified and pulled out without complications.     Appropriate for Nexplanon Insertion today      PROCEDURE: Consent was reviewed and signed.  Patient was placed supine with left arm exposed.  Adal was made 8-10 cm above medial epicondyle and a guiding adal 4 cm above the first.  Arm was prepped with Betadine. Insertion point was anesthetized with 2 mL 1% lidocaine. After stretching the skin with thumb and index finger around the insertion site, skin punctured with the tip of the needle inserted at 30 degrees and then lowered to horizontal position. The needle was then advanced to its full length. Applicator was then stabilized and slider was unlocked. Slider was pulled back until it stopped and then removed. Steri strips were placed.   Correct placement of the implant was confirmed by palpation in the patient's arm and visualizing the purple top of the obturator.   Bandage and pressure dressing applied to insertion site. Pt tolerated procedure well without complications. EBL was minimal.      Patient has been advised of split billing requirements and indicates understanding: Yes  COUNSELING:  Reviewed preventive health counseling, as reflected in patient instructions       Regular exercise       Healthy diet/nutrition       Vision screening       Contraception     "   Family planning       Osteoporosis prevention/bone health       Colon cancer screening    Estimated body mass index is 37.13 kg/m  as calculated from the following:    Height as of 2/6/20: 1.575 m (5' 2\").    Weight as of 6/4/21: 92.1 kg (203 lb).    Weight management plan: Discussed healthy diet and exercise guidelines    She reports that she has quit smoking. She has never used smokeless tobacco.      Counseling Resources:  ATP IV Guidelines  Pooled Cohorts Equation Calculator  Breast Cancer Risk Calculator  BRCA-Related Cancer Risk Assessment: FHS-7 Tool  FRAX Risk Assessment  ICSI Preventive Guidelines  Dietary Guidelines for Americans, 2010  USDA's MyPlate  ASA Prophylaxis  Lung CA Screening    YULI Mejia CNP  [facility]  "

## 2021-10-06 ENCOUNTER — OFFICE VISIT (OUTPATIENT)
Dept: FAMILY MEDICINE | Facility: CLINIC | Age: 41
End: 2021-10-06
Payer: COMMERCIAL

## 2021-10-06 VITALS
RESPIRATION RATE: 14 BRPM | DIASTOLIC BLOOD PRESSURE: 66 MMHG | BODY MASS INDEX: 37 KG/M2 | SYSTOLIC BLOOD PRESSURE: 110 MMHG | OXYGEN SATURATION: 98 % | TEMPERATURE: 97.3 F | WEIGHT: 208.8 LBS | HEART RATE: 75 BPM | HEIGHT: 63 IN

## 2021-10-06 DIAGNOSIS — R21 RASH AND NONSPECIFIC SKIN ERUPTION: Primary | ICD-10-CM

## 2021-10-06 DIAGNOSIS — Z23 NEED FOR PROPHYLACTIC VACCINATION AND INOCULATION AGAINST INFLUENZA: ICD-10-CM

## 2021-10-06 PROCEDURE — 99213 OFFICE O/P EST LOW 20 MIN: CPT | Mod: 25 | Performed by: FAMILY MEDICINE

## 2021-10-06 PROCEDURE — 90686 IIV4 VACC NO PRSV 0.5 ML IM: CPT | Performed by: FAMILY MEDICINE

## 2021-10-06 PROCEDURE — 90471 IMMUNIZATION ADMIN: CPT | Performed by: FAMILY MEDICINE

## 2021-10-06 RX ORDER — TRIAMCINOLONE ACETONIDE 1 MG/G
CREAM TOPICAL 2 TIMES DAILY
Qty: 80 G | Refills: 0 | Status: SHIPPED | OUTPATIENT
Start: 2021-10-06 | End: 2021-10-16

## 2021-10-06 ASSESSMENT — PAIN SCALES - GENERAL: PAINLEVEL: NO PAIN (0)

## 2021-10-06 ASSESSMENT — MIFFLIN-ST. JEOR: SCORE: 1587.36

## 2021-10-06 NOTE — PROGRESS NOTES
"  Assessment & Plan     Rash and nonspecific skin eruption  Differentials discussed in detail.  Symptoms likely secondary to dermatitis.  Kenalog cream prescribed, common side effects discussed.  Suggested to keep the area dry and clean.  Recommended to schedule appointment with dermatology if symptoms persist.  Patient understood and in agreement with above plan.  All questions answered.  - triamcinolone (KENALOG) 0.1 % external cream; Apply topically 2 times daily for 10 days  - Adult Dermatology Referral; Future      Baldo Boyce MD  [facility]    Luz Begum is a 41 year old who presents for the following health issues  HPI     Rash  Onset/Duration: Started in July when she went to [place]. Originally thought it was a heat type of rash. Worse in the last 2-3 week  Description  Location: neck  Character: blotchy, red  Itching: moderate  Intensity:  moderate  Progression of Symptoms:  worsening  Accompanying signs and symptoms:   Fever: no  Body aches or joint pain: no  Sore throat symptoms: no  Recent cold symptoms: no  History:           Previous episodes of similar rash: None  New exposures:  None; went swimming in the ocean  Recent travel: YES [place] in July  and [place] in August   Exposure to similar rash: no  Precipitating or alleviating factors:   Therapies tried and outcome:  Lotion, Vaseline, triple antibiotics, anti-itch, talcum powder        Review of Systems   Constitutional, HEENT, cardiovascular, pulmonary, gi and gu systems are negative, except as otherwise noted.      Objective    /66 (BP Location: Right arm, Patient Position: Sitting, Cuff Size: Adult Regular)   Pulse 75   Temp 97.3  F (36.3  C) (Tympanic)   Resp 14   Ht 1.61 m (5' 3.39\")   Wt 94.7 kg (208 lb 12.8 oz)   SpO2 98%   BMI 36.54 kg/m    Body mass index is 36.54 kg/m .  Physical Exam   GENERAL: alert and no distress  EYES: Eyes grossly normal to inspection  NECK: no " adenopathy, no asymmetry, masses, or scars and thyroid normal to palpation  SKIN: faint erythematous rash involving neck circumferentially with some skin dryness, no vesicles or discharge noted  NEURO: Normal strength and tone, mentation intact and speech normal  PSYCH: mentation appears normal, affect normal/bright

## 2022-09-20 ENCOUNTER — OFFICE VISIT (OUTPATIENT)
Dept: FAMILY MEDICINE | Facility: CLINIC | Age: 42
End: 2022-09-20
Payer: COMMERCIAL

## 2022-09-20 VITALS
TEMPERATURE: 98.7 F | BODY MASS INDEX: 38.46 KG/M2 | HEART RATE: 70 BPM | WEIGHT: 209 LBS | HEIGHT: 62 IN | DIASTOLIC BLOOD PRESSURE: 82 MMHG | SYSTOLIC BLOOD PRESSURE: 130 MMHG | RESPIRATION RATE: 18 BRPM

## 2022-09-20 DIAGNOSIS — M79.672 PAIN IN BOTH FEET: ICD-10-CM

## 2022-09-20 DIAGNOSIS — K64.9 HEMORRHOIDS, UNSPECIFIED HEMORRHOID TYPE: ICD-10-CM

## 2022-09-20 DIAGNOSIS — Z00.00 ROUTINE GENERAL MEDICAL EXAMINATION AT A HEALTH CARE FACILITY: Primary | ICD-10-CM

## 2022-09-20 DIAGNOSIS — K92.1 BLOOD IN STOOL: ICD-10-CM

## 2022-09-20 DIAGNOSIS — M25.559 HIP PAIN: ICD-10-CM

## 2022-09-20 DIAGNOSIS — M79.671 PAIN IN BOTH FEET: ICD-10-CM

## 2022-09-20 DIAGNOSIS — Z23 NEED FOR PROPHYLACTIC VACCINATION AND INOCULATION AGAINST INFLUENZA: ICD-10-CM

## 2022-09-20 LAB
ALBUMIN SERPL BCG-MCNC: 4.3 G/DL (ref 3.5–5.2)
ALP SERPL-CCNC: 81 U/L (ref 35–104)
ALT SERPL W P-5'-P-CCNC: 28 U/L (ref 10–35)
ANION GAP SERPL CALCULATED.3IONS-SCNC: 11 MMOL/L (ref 7–15)
AST SERPL W P-5'-P-CCNC: 22 U/L (ref 10–35)
BILIRUB SERPL-MCNC: <0.2 MG/DL
BUN SERPL-MCNC: 13.1 MG/DL (ref 6–20)
CALCIUM SERPL-MCNC: 9 MG/DL (ref 8.6–10)
CHLORIDE SERPL-SCNC: 106 MMOL/L (ref 98–107)
CHOLEST SERPL-MCNC: 221 MG/DL
CREAT SERPL-MCNC: 0.94 MG/DL (ref 0.51–0.95)
DEPRECATED HCO3 PLAS-SCNC: 23 MMOL/L (ref 22–29)
ERYTHROCYTE [DISTWIDTH] IN BLOOD BY AUTOMATED COUNT: 12.7 % (ref 10–15)
ERYTHROCYTE [SEDIMENTATION RATE] IN BLOOD BY WESTERGREN METHOD: 5 MM/HR (ref 0–20)
GFR SERPL CREATININE-BSD FRML MDRD: 77 ML/MIN/1.73M2
GLUCOSE SERPL-MCNC: 131 MG/DL (ref 70–99)
HBA1C MFR BLD: 5.2 % (ref 0–5.6)
HCT VFR BLD AUTO: 38.7 % (ref 35–47)
HDLC SERPL-MCNC: 40 MG/DL
HGB BLD-MCNC: 13 G/DL (ref 11.7–15.7)
LDLC SERPL CALC-MCNC: 141 MG/DL
MCH RBC QN AUTO: 30.7 PG (ref 26.5–33)
MCHC RBC AUTO-ENTMCNC: 33.6 G/DL (ref 31.5–36.5)
MCV RBC AUTO: 91 FL (ref 78–100)
NONHDLC SERPL-MCNC: 181 MG/DL
PLATELET # BLD AUTO: 276 10E3/UL (ref 150–450)
POTASSIUM SERPL-SCNC: 3.6 MMOL/L (ref 3.4–5.3)
PROT SERPL-MCNC: 6.7 G/DL (ref 6.4–8.3)
RBC # BLD AUTO: 4.24 10E6/UL (ref 3.8–5.2)
SODIUM SERPL-SCNC: 140 MMOL/L (ref 136–145)
TRIGL SERPL-MCNC: 200 MG/DL
TSH SERPL DL<=0.005 MIU/L-ACNC: 1.09 UIU/ML (ref 0.3–4.2)
WBC # BLD AUTO: 9.6 10E3/UL (ref 4–11)

## 2022-09-20 PROCEDURE — 86618 LYME DISEASE ANTIBODY: CPT | Performed by: FAMILY MEDICINE

## 2022-09-20 PROCEDURE — 99214 OFFICE O/P EST MOD 30 MIN: CPT | Mod: 25 | Performed by: FAMILY MEDICINE

## 2022-09-20 PROCEDURE — 83036 HEMOGLOBIN GLYCOSYLATED A1C: CPT | Performed by: FAMILY MEDICINE

## 2022-09-20 PROCEDURE — 85652 RBC SED RATE AUTOMATED: CPT | Performed by: FAMILY MEDICINE

## 2022-09-20 PROCEDURE — 90471 IMMUNIZATION ADMIN: CPT | Performed by: FAMILY MEDICINE

## 2022-09-20 PROCEDURE — 80053 COMPREHEN METABOLIC PANEL: CPT | Performed by: FAMILY MEDICINE

## 2022-09-20 PROCEDURE — 36415 COLL VENOUS BLD VENIPUNCTURE: CPT | Performed by: FAMILY MEDICINE

## 2022-09-20 PROCEDURE — 84443 ASSAY THYROID STIM HORMONE: CPT | Performed by: FAMILY MEDICINE

## 2022-09-20 PROCEDURE — 85027 COMPLETE CBC AUTOMATED: CPT | Performed by: FAMILY MEDICINE

## 2022-09-20 PROCEDURE — 80061 LIPID PANEL: CPT | Performed by: FAMILY MEDICINE

## 2022-09-20 PROCEDURE — 90686 IIV4 VACC NO PRSV 0.5 ML IM: CPT | Performed by: FAMILY MEDICINE

## 2022-09-20 PROCEDURE — 99396 PREV VISIT EST AGE 40-64: CPT | Mod: 25 | Performed by: FAMILY MEDICINE

## 2022-09-20 ASSESSMENT — ENCOUNTER SYMPTOMS
NAUSEA: 0
DIARRHEA: 1
HEMATURIA: 0
HEADACHES: 0
ABDOMINAL PAIN: 0
FEVER: 0
SHORTNESS OF BREATH: 0
NERVOUS/ANXIOUS: 0
COUGH: 0
EYE PAIN: 0
FREQUENCY: 1
CONSTIPATION: 0
DYSURIA: 0
BREAST MASS: 0
PALPITATIONS: 0
PARESTHESIAS: 0
DIZZINESS: 0
JOINT SWELLING: 0
HEMATOCHEZIA: 1
HEARTBURN: 0
ARTHRALGIAS: 0
CHILLS: 0
MYALGIAS: 0
WEAKNESS: 0
SORE THROAT: 0

## 2022-09-20 ASSESSMENT — PAIN SCALES - GENERAL: PAINLEVEL: NO PAIN (0)

## 2022-09-20 NOTE — NURSING NOTE
"Chief Complaint   Patient presents with     Physical     /82 (Cuff Size: Adult Regular)   Pulse 70   Temp 98.7  F (37.1  C) (Tympanic)   Resp 18   Ht 1.575 m (5' 2\")   Wt 94.8 kg (209 lb)   LMP  (LMP Unknown)   Breastfeeding No   BMI 38.23 kg/m   Estimated body mass index is 38.23 kg/m  as calculated from the following:    Height as of this encounter: 1.575 m (5' 2\").    Weight as of this encounter: 94.8 kg (209 lb).  Patient presents to the clinic using No DME      Health Maintenance that is potentially due pending provider review:    Health Maintenance Due   Topic Date Due     ANNUAL REVIEW OF HM ORDERS  Never done     HIV SCREENING  Never done     HEPATITIS C SCREENING  Never done     DTAP/TDAP/TD IMMUNIZATION (2 - Td or Tdap) 04/23/2020     COVID-19 Vaccine (3 - Booster for Rony series) 02/02/2022     INFLUENZA VACCINE (1) 09/01/2022     PREVENTIVE CARE VISIT  09/16/2022                "

## 2022-09-20 NOTE — LETTER
September 26, 2022      Shysheng Mcfarlane  [address] [place] [id number]        Dear ,    We are writing to inform you of your test results.    Lyme's test result came back negative.  Let us know if there are any questions.    Resulted Orders   CBC with platelets   Result Value Ref Range    WBC Count 9.6 4.0 - 11.0 10e3/uL    RBC Count 4.24 3.80 - 5.20 10e6/uL    Hemoglobin 13.0 11.7 - 15.7 g/dL    Hematocrit 38.7 35.0 - 47.0 %    MCV 91 78 - 100 fL    MCH 30.7 26.5 - 33.0 pg    MCHC 33.6 31.5 - 36.5 g/dL    RDW 12.7 10.0 - 15.0 %    Platelet Count 276 150 - 450 10e3/uL   Comprehensive metabolic panel (BMP + Alb, Alk Phos, ALT, AST, Total. Bili, TP)   Result Value Ref Range    Sodium 140 136 - 145 mmol/L    Potassium 3.6 3.4 - 5.3 mmol/L    Chloride 106 98 - 107 mmol/L    Carbon Dioxide (CO2) 23 22 - 29 mmol/L    Anion Gap 11 7 - 15 mmol/L    Urea Nitrogen 13.1 6.0 - 20.0 mg/dL    Creatinine 0.94 0.51 - 0.95 mg/dL    Calcium 9.0 8.6 - 10.0 mg/dL    Glucose 131 (H) 70 - 99 mg/dL    Alkaline Phosphatase 81 35 - 104 U/L    AST 22 10 - 35 U/L    ALT 28 10 - 35 U/L    Protein Total 6.7 6.4 - 8.3 g/dL    Albumin 4.3 3.5 - 5.2 g/dL    Bilirubin Total <0.2 <=1.2 mg/dL    GFR Estimate 77 >60 mL/min/1.73m2      Comment:      Effective December 21, 2021 eGFRcr in adults is calculated using the 2021 CKD-EPI creatinine equation which includes age and gender (Elaine et al., NEJ, DOI: 10.1056/BYHVab0552065)   TSH with free T4 reflex   Result Value Ref Range    TSH 1.09 0.30 - 4.20 uIU/mL   ESR: Erythrocyte sedimentation rate   Result Value Ref Range    Erythrocyte Sedimentation Rate 5 0 - 20 mm/hr   Lyme Disease Total Abs Bld with Reflex to Confirm CLIA   Result Value Ref Range    Lyme Disease Antibodies Total 0.22 <0.90      Comment:      Non-reactive, Absence of detectable Borrelia burgdorferi antibodies. A non-reactive result does not exclude the possibility of Borrelia burgdorferi infection. If early Lyme disease is  suspected, a second sample should be collected and tested 2 to 4 weeks later.   Hemoglobin A1c   Result Value Ref Range    Hemoglobin A1C 5.2 0.0 - 5.6 %      Comment:      Normal <5.7%   Prediabetes 5.7-6.4%    Diabetes 6.5% or higher     Note: Adopted from ADA consensus guidelines.   Lipid panel   Result Value Ref Range    Cholesterol 221 (H) <200 mg/dL    Triglycerides 200 (H) <150 mg/dL    Direct Measure HDL 40 (L) >=50 mg/dL    LDL Cholesterol Calculated 141 (H) <=100 mg/dL    Non HDL Cholesterol 181 (H) <130 mg/dL    Narrative    Cholesterol  Desirable:  <200 mg/dL    Triglycerides  Normal:  Less than 150 mg/dL  Borderline High:  150-199 mg/dL  High:  200-499 mg/dL  Very High:  Greater than or equal to 500 mg/dL    Direct Measure HDL  Female:  Greater than or equal to 50 mg/dL   Male:  Greater than or equal to 40 mg/dL    LDL Cholesterol  Desirable:  <100mg/dL  Above Desirable:  100-129 mg/dL   Borderline High:  130-159 mg/dL   High:  160-189 mg/dL   Very High:  >= 190 mg/dL    Non HDL Cholesterol  Desirable:  130 mg/dL  Above Desirable:  130-159 mg/dL  Borderline High:  160-189 mg/dL  High:  190-219 mg/dL  Very High:  Greater than or equal to 220 mg/dL       If you have any questions or concerns, please call the clinic at the number listed above.       Sincerely,      Baldo Boyce MD

## 2022-09-20 NOTE — PROGRESS NOTES
marycarmen   SUBJECTIVE:   CC: Shy is an 42 year old who presents for preventive health visit.     Patient has been advised of split billing requirements and indicates understanding: Yes  Healthy Habits:     Getting at least 3 servings of Calcium per day:  Yes    Bi-annual eye exam:  Yes    Dental care twice a year:  Yes    Sleep apnea or symptoms of sleep apnea:  Daytime drowsiness    Diet:  Regular (no restrictions)    Frequency of exercise:  1 day/week    Duration of exercise:  N/A    Taking medications regularly:  Yes    Medication side effects:  Not applicable    PHQ-2 Total Score: 0    Additional concerns today:  Yes      PROBLEMS TO ADD ON..  - horomone level check- hair loss, heat flashes  - hip pain on the right side. Stands like one leg is shorter then the other  - foot pain in both feet. Sharp shooting pains. Very random pain.   - has to clear her throat after eating. Feels like stuff is caught  - Blood in her stool when going to the bathroom. Was rare in the past- getting more common.       Today's PHQ-2 Score:   PHQ-2 ( 1999 Pf[employer]) 9/20/2022   Q1: Little interest or pleasure in doing things 0   Q2: Feeling down, depressed or hopeless 0   PHQ-2 Score 0   PHQ-2 Total Score (12-17 Years)- Positive if 3 or more points; Administer PHQ-A if positive -   Q1: Little interest or pleasure in doing things Not at all   Q2: Feeling down, depressed or hopeless Not at all   PHQ-2 Score 0       Abuse: Current or Past (Physical, Sexual or Emotional) - No  Do you feel safe in your environment? Yes        Social History     Tobacco Use     Smoking status: Former Smoker     Smokeless tobacco: Never Used   Substance Use Topics     Alcohol use: No       Alcohol Use 9/20/2022   Prescreen: >3 drinks/day or >7 drinks/week? Not Applicable   Prescreen: >3 drinks/day or >7 drinks/week? -       Reviewed orders with patient.  Reviewed health maintenance and updated orders accordingly - Yes  Lab work is in process  Labs reviewed in  EPIC  BP Readings from Last 3 Encounters:   09/20/22 130/82   10/06/21 110/66   09/16/21 114/70    Wt Readings from Last 3 Encounters:   09/20/22 94.8 kg (209 lb)   10/06/21 94.7 kg (208 lb 12.8 oz)   09/16/21 94.8 kg (209 lb)                  There is no problem list on file for this patient.    No past surgical history on file.    Social History     Tobacco Use     Smoking status: Former Smoker     Smokeless tobacco: Never Used   Substance Use Topics     Alcohol use: No     Family History   Problem Relation Age of Onset     Cerebrovascular Disease Father      Cancer Paternal Grandmother      Lung Cancer Paternal Grandfather          Current Outpatient Medications   Medication Sig Dispense Refill     Etonogestrel (IMPLANON SC)        traZODone (DESYREL) 50 MG tablet Take 1-2 tablets ( mg) by mouth nightly as needed for sleep (Patient not taking: No sig reported) 30 tablet 3     No Known Allergies  Recent Labs   Lab Test 06/04/21  1640 09/05/18  0927   LDL  --  149*   HDL  --  35*   TRIG  --  172*   ALT 29  --    CR 0.84 0.87   GFRESTIMATED 87 72   GFRESTBLACK >90 88   POTASSIUM 3.6 3.8        Breast Cancer Screening:    Breast CA Risk Assessment (FHS-7) 9/16/2021   Do you have a family history of breast, colon, or ovarian cancer? No / Unknown       click delete button to remove this line now    Pertinent mammograms are reviewed under the imaging tab.    History of abnormal Pap smear:   Last 3 Pap Results:   PAP (no units)   Date Value   09/05/2018 NIL     PAP / HPV Latest Ref Rng & Units 9/5/2018   PAP (Historical) - NIL   HPV16 NEG:Negative Negative   HPV18 NEG:Negative Negative   HRHPV NEG:Negative Negative     Reviewed and updated as needed this visit by clinical staff   Tobacco  Allergies  Meds                Reviewed and updated as needed this visit by Provider                       Review of Systems   Constitutional: Negative for chills and fever.   HENT: Negative for congestion, ear pain, hearing  "loss and sore throat.    Eyes: Negative for pain and visual disturbance.   Respiratory: Negative for cough and shortness of breath.    Cardiovascular: Negative for chest pain, palpitations and peripheral edema.   Gastrointestinal: Positive for diarrhea and hematochezia. Negative for abdominal pain, constipation, heartburn and nausea.   Breasts:  Negative for tenderness, breast mass and discharge.   Genitourinary: Positive for frequency. Negative for dysuria, genital sores, hematuria, pelvic pain, urgency, vaginal bleeding and vaginal discharge.   Musculoskeletal: Negative for arthralgias, joint swelling and myalgias.   Skin: Negative for rash.   Neurological: Negative for dizziness, weakness, headaches and paresthesias.   Psychiatric/Behavioral: Negative for mood changes. The patient is not nervous/anxious.        OBJECTIVE:   /82 (Cuff Size: Adult Regular)   Pulse 70   Temp 98.7  F (37.1  C) (Tympanic)   Resp 18   Ht 1.575 m (5' 2\")   Wt 94.8 kg (209 lb)   LMP  (LMP Unknown)   Breastfeeding No   BMI 38.23 kg/m    Physical Exam  GENERAL: alert, no distress and obese  EYES: Eyes grossly normal to inspection, PERRL and conjunctivae and sclerae normal  HENT: normal cephalic/atraumatic, nose and mouth without ulcers or lesions, oropharynx clear and oral mucous membranes moist  NECK: no adenopathy, no asymmetry, masses, or scars and thyroid normal to palpation  RESP: lungs clear to auscultation - no rales, rhonchi or wheezes  CV: regular rate and rhythm, normal S1 S2, no S3 or S4, no murmur, click or rub, no peripheral edema and peripheral pulses strong  ABDOMEN: soft, nontender, no hepatosplenomegaly, no masses and bowel sounds normal  RECTAL (female): normal rectal tone: Anoscopy: internal hemorrhoids, no polyps or active bleeding noted  MS: no gross musculoskeletal defects noted, no edema, normal upper and lower extremities strength, range of movement normal, pedal pulses 3+, normal gait, sensation to " touch and pressure intact  SKIN: no suspicious lesions or rashes  NEURO: Normal strength and tone, mentation intact and speech normal  PSYCH: mentation appears normal, affect normal/bright    Wt Readings from Last 10 Encounters:   09/20/22 94.8 kg (209 lb)   10/06/21 94.7 kg (208 lb 12.8 oz)   09/16/21 94.8 kg (209 lb)   06/04/21 92.1 kg (203 lb)   02/06/20 89.6 kg (197 lb 8.5 oz)   10/23/18 86.3 kg (190 lb 3.2 oz)   09/05/18 89.4 kg (197 lb)   03/07/17 92.1 kg (203 lb)   09/11/15 94.5 kg (208 lb 6 oz)   09/10/15 93.4 kg (206 lb)       ASSESSMENT/PLAN:   (Z00.00) Routine general medical examination at a health care facility  (primary encounter diagnosis)  Comment: Patient has been experiencing pain in both hips and feet for about 6 months or so.  Musculoskeletal exam unremarkable.  Differential discussed in detail rheumatological, orthopedic, metabolic and infectious etiology.  X-ray pelvis and feet ordered for further evaluation.  Suggested physical therapy and orthopedic referral placed.  Influenza vaccine administered in office today. Healthy lifestyle modifications stressed including regular exercise, balanced diet, weight loss and limiting salt/caffeine/pop intake.      (M25.559) Hip pain  Comment: As above  Plan: ESR: Erythrocyte sedimentation rate, XR Pelvis         and Hip Bilateral 2 Views, Orthopedic         Referral, Physical Therapy Referral, Lyme         Disease Total Abs Bld with Reflex to Confirm         CLIA            (M79.671,  M79.672) Pain in both feet  Comment: Differential discussed in detail including plantar fasciitis.  Suggested shoe inserts, over-the-counter analgesia, stretching exercises and physical therapy ordered.  Recommended to follow-up with a podiatrist as well  Plan: Physical Therapy Referral, Orthopedic         Referral          (K92.1) Blood in stool  Comment: Physical examination remarkable for internal hemorrhoids.  Recommended well hydration, regular walks,  "increasing fiber in diet, Preparation H, warm sitz bath and general surgery referral placed for further review and recommendations  6 month  Plan: CBC with platelets, Comprehensive metabolic         panel (BMP + Alb, Alk Phos, ALT, AST, Total.         Bili, TP), TSH with free T4 reflex       (K64.9) Hemorrhoids, unspecified hemorrhoid type  Comment:   Plan: Adult General Surg Referral, Anoscopy            (Z23) Need for prophylactic vaccination and inoculation against influenza  Comment:  Plan: INFLUENZA VACCINE IM > 6 MONTHS VALENT IIV4         (AFLURIA/FLUZONE)          COUNSELING:  Reviewed preventive health counseling, as reflected in patient instructions    Estimated body mass index is 38.23 kg/m  as calculated from the following:    Height as of this encounter: 1.575 m (5' 2\").    Weight as of this encounter: 94.8 kg (209 lb).    Weight management plan: Discussed healthy diet and exercise guidelines    She reports that she has quit smoking. She has never used smokeless tobacco.      Counseling Resources:  ATP IV Guidelines  Pooled Cohorts Equation Calculator  Breast Cancer Risk Calculator  BRCA-Related Cancer Risk Assessment: FHS-7 Tool  FRAX Risk Assessment  ICSI Preventive Guidelines  Dietary Guidelines for Americans, 2010  USDA's MyPlate  ASA Prophylaxis  Lung CA Screening    Baldo Boyce MD  [facility]  "

## 2022-09-21 LAB — B BURGDOR IGG+IGM SER QL: 0.22

## 2023-10-29 ENCOUNTER — HEALTH MAINTENANCE LETTER (OUTPATIENT)
Age: 43
End: 2023-10-29

## 2024-03-17 ENCOUNTER — HEALTH MAINTENANCE LETTER (OUTPATIENT)
Age: 44
End: 2024-03-17

## 2024-09-09 ENCOUNTER — OFFICE VISIT (OUTPATIENT)
Dept: FAMILY MEDICINE | Facility: CLINIC | Age: 44
End: 2024-09-09
Payer: COMMERCIAL

## 2024-09-09 VITALS
OXYGEN SATURATION: 98 % | DIASTOLIC BLOOD PRESSURE: 74 MMHG | BODY MASS INDEX: 38.45 KG/M2 | HEIGHT: 63 IN | WEIGHT: 217 LBS | TEMPERATURE: 98.9 F | RESPIRATION RATE: 20 BRPM | HEART RATE: 89 BPM | SYSTOLIC BLOOD PRESSURE: 138 MMHG

## 2024-09-09 DIAGNOSIS — Z30.46 ENCOUNTER FOR REMOVAL AND REINSERTION OF NEXPLANON: ICD-10-CM

## 2024-09-09 DIAGNOSIS — Z00.00 ROUTINE GENERAL MEDICAL EXAMINATION AT A HEALTH CARE FACILITY: Primary | ICD-10-CM

## 2024-09-09 DIAGNOSIS — Z30.017 INSERTION OF IMPLANTABLE SUBDERMAL CONTRACEPTIVE: ICD-10-CM

## 2024-09-09 DIAGNOSIS — Z13.1 SCREENING FOR DIABETES MELLITUS: ICD-10-CM

## 2024-09-09 DIAGNOSIS — Z12.4 CERVICAL CANCER SCREENING: ICD-10-CM

## 2024-09-09 DIAGNOSIS — Z12.31 VISIT FOR SCREENING MAMMOGRAM: ICD-10-CM

## 2024-09-09 LAB
ALBUMIN SERPL BCG-MCNC: 4.3 G/DL (ref 3.5–5.2)
ALP SERPL-CCNC: 87 U/L (ref 40–150)
ALT SERPL W P-5'-P-CCNC: 25 U/L (ref 0–50)
ANION GAP SERPL CALCULATED.3IONS-SCNC: 9 MMOL/L (ref 7–15)
AST SERPL W P-5'-P-CCNC: 21 U/L (ref 0–45)
BILIRUB SERPL-MCNC: 0.2 MG/DL
BUN SERPL-MCNC: 8.6 MG/DL (ref 6–20)
CALCIUM SERPL-MCNC: 9 MG/DL (ref 8.8–10.4)
CHLORIDE SERPL-SCNC: 105 MMOL/L (ref 98–107)
CHOLEST SERPL-MCNC: 223 MG/DL
CREAT SERPL-MCNC: 0.74 MG/DL (ref 0.51–0.95)
EGFRCR SERPLBLD CKD-EPI 2021: >90 ML/MIN/1.73M2
ERYTHROCYTE [DISTWIDTH] IN BLOOD BY AUTOMATED COUNT: 12.8 % (ref 10–15)
FASTING STATUS PATIENT QL REPORTED: ABNORMAL
FASTING STATUS PATIENT QL REPORTED: ABNORMAL
GLUCOSE SERPL-MCNC: 141 MG/DL (ref 70–99)
HCO3 SERPL-SCNC: 24 MMOL/L (ref 22–29)
HCT VFR BLD AUTO: 39.9 % (ref 35–47)
HDLC SERPL-MCNC: 38 MG/DL
HGB BLD-MCNC: 13.1 G/DL (ref 11.7–15.7)
LDLC SERPL CALC-MCNC: 141 MG/DL
MCH RBC QN AUTO: 30 PG (ref 26.5–33)
MCHC RBC AUTO-ENTMCNC: 32.8 G/DL (ref 31.5–36.5)
MCV RBC AUTO: 91 FL (ref 78–100)
NONHDLC SERPL-MCNC: 185 MG/DL
PLATELET # BLD AUTO: 266 10E3/UL (ref 150–450)
POTASSIUM SERPL-SCNC: 3.6 MMOL/L (ref 3.4–5.3)
PROT SERPL-MCNC: 6.7 G/DL (ref 6.4–8.3)
RBC # BLD AUTO: 4.37 10E6/UL (ref 3.8–5.2)
SODIUM SERPL-SCNC: 138 MMOL/L (ref 135–145)
TRIGL SERPL-MCNC: 220 MG/DL
TSH SERPL DL<=0.005 MIU/L-ACNC: 1.06 UIU/ML (ref 0.3–4.2)
WBC # BLD AUTO: 9.3 10E3/UL (ref 4–11)

## 2024-09-09 PROCEDURE — 84443 ASSAY THYROID STIM HORMONE: CPT | Performed by: NURSE PRACTITIONER

## 2024-09-09 PROCEDURE — 80061 LIPID PANEL: CPT | Performed by: NURSE PRACTITIONER

## 2024-09-09 PROCEDURE — 85027 COMPLETE CBC AUTOMATED: CPT | Performed by: NURSE PRACTITIONER

## 2024-09-09 PROCEDURE — 11983 REMOVE/INSERT DRUG IMPLANT: CPT | Performed by: NURSE PRACTITIONER

## 2024-09-09 PROCEDURE — 80053 COMPREHEN METABOLIC PANEL: CPT | Performed by: NURSE PRACTITIONER

## 2024-09-09 PROCEDURE — 87491 CHLMYD TRACH DNA AMP PROBE: CPT | Performed by: NURSE PRACTITIONER

## 2024-09-09 PROCEDURE — 99396 PREV VISIT EST AGE 40-64: CPT | Mod: 25 | Performed by: NURSE PRACTITIONER

## 2024-09-09 PROCEDURE — 87624 HPV HI-RISK TYP POOLED RSLT: CPT | Performed by: NURSE PRACTITIONER

## 2024-09-09 PROCEDURE — 36415 COLL VENOUS BLD VENIPUNCTURE: CPT | Performed by: NURSE PRACTITIONER

## 2024-09-09 PROCEDURE — 87591 N.GONORRHOEAE DNA AMP PROB: CPT | Performed by: NURSE PRACTITIONER

## 2024-09-09 PROCEDURE — G0145 SCR C/V CYTO,THINLAYER,RESCR: HCPCS | Performed by: NURSE PRACTITIONER

## 2024-09-09 SDOH — HEALTH STABILITY: PHYSICAL HEALTH
ON AVERAGE, HOW MANY DAYS PER WEEK DO YOU ENGAGE IN MODERATE TO STRENUOUS EXERCISE (LIKE A BRISK WALK)?: PATIENT DECLINED

## 2024-09-09 ASSESSMENT — PAIN SCALES - GENERAL: PAINLEVEL: NO PAIN (0)

## 2024-09-09 NOTE — PATIENT INSTRUCTIONS
Patient Education   Preventive Care Advice   This is general advice given by our system to help you stay healthy. However, your care team may have specific advice just for you. Please talk to your care team about your preventive care needs.  Nutrition  Eat 5 or more servings of fruits and vegetables each day.  Try wheat bread, brown rice and whole grain pasta (instead of white bread, rice, and pasta).  Get enough calcium and vitamin D. Check the label on foods and aim for 100% of the RDA (recommended daily allowance).  Lifestyle  Exercise at least 150 minutes each week  (30 minutes a day, 5 days a week).  Do muscle strengthening activities 2 days a week. These help control your weight and prevent disease.  No smoking.  Wear sunscreen to prevent skin cancer.  Have a dental exam and cleaning every 6 months.  Yearly exams  See your health care team every year to talk about:  Any changes in your health.  Any medicines your care team has prescribed.  Preventive care, family planning, and ways to prevent chronic diseases.  Shots (vaccines)   HPV shots (up to age 26), if you've never had them before.  Hepatitis B shots (up to age 59), if you've never had them before.  COVID-19 shot: Get this shot when it's due.  Flu shot: Get a flu shot every year.  Tetanus shot: Get a tetanus shot every 10 years.  Pneumococcal, hepatitis A, and RSV shots: Ask your care team if you need these based on your risk.  Shingles shot (for age 50 and up)  General health tests  Diabetes screening:  Starting at age 35, Get screened for diabetes at least every 3 years.  If you are younger than age 35, ask your care team if you should be screened for diabetes.  Cholesterol test: At age 39, start having a cholesterol test every 5 years, or more often if advised.  Bone density scan (DEXA): At age 50, ask your care team if you should have this scan for osteoporosis (brittle bones).  Hepatitis C: Get tested at least once in your life.  STIs (sexually  transmitted infections)  Before age 24: Ask your care team if you should be screened for STIs.  After age 24: Get screened for STIs if you're at risk. You are at risk for STIs (including HIV) if:  You are sexually active with more than one person.  You don't use condoms every time.  You or a partner was diagnosed with a sexually transmitted infection.  If you are at risk for HIV, ask about PrEP medicine to prevent HIV.  Get tested for HIV at least once in your life, whether you are at risk for HIV or not.  Cancer screening tests  Cervical cancer screening: If you have a cervix, begin getting regular cervical cancer screening tests starting at age 21.  Breast cancer scan (mammogram): If you've ever had breasts, begin having regular mammograms starting at age 40. This is a scan to check for breast cancer.  Colon cancer screening: It is important to start screening for colon cancer at age 45.  Have a colonoscopy test every 10 years (or more often if you're at risk) Or, ask your provider about stool tests like a FIT test every year or Cologuard test every 3 years.  To learn more about your testing options, visit:   .  For help making a decision, visit:   https://bit.ly/pr42629.  Prostate cancer screening test: If you have a prostate, ask your care team if a prostate cancer screening test (PSA) at age 55 is right for you.  Lung cancer screening: If you are a current or former smoker ages 50 to 80, ask your care team if ongoing lung cancer screenings are right for you.  For informational purposes only. Not to replace the advice of your health care provider. Copyright   2023 [facility]. All rights reserved. Clinically reviewed by the [facility] Transitions Program. [employer] [id number] - REV 01/24.     Patient Education   Preventive Care Advice   This is general advice given by our system to help you stay healthy. However, your care team may have specific advice just for you. Please talk to your care team  about your preventive care needs.  Nutrition  Eat 5 or more servings of fruits and vegetables each day.  Try wheat bread, brown rice and whole grain pasta (instead of white bread, rice, and pasta).  Get enough calcium and vitamin D. Check the label on foods and aim for 100% of the RDA (recommended daily allowance).  Lifestyle  Exercise at least 150 minutes each week  (30 minutes a day, 5 days a week).  Do muscle strengthening activities 2 days a week. These help control your weight and prevent disease.  No smoking.  Wear sunscreen to prevent skin cancer.  Have a dental exam and cleaning every 6 months.  Yearly exams  See your health care team every year to talk about:  Any changes in your health.  Any medicines your care team has prescribed.  Preventive care, family planning, and ways to prevent chronic diseases.  Shots (vaccines)   HPV shots (up to age 26), if you've never had them before.  Hepatitis B shots (up to age 59), if you've never had them before.  COVID-19 shot: Get this shot when it's due.  Flu shot: Get a flu shot every year.  Tetanus shot: Get a tetanus shot every 10 years.  Pneumococcal, hepatitis A, and RSV shots: Ask your care team if you need these based on your risk.  Shingles shot (for age 50 and up)  General health tests  Diabetes screening:  Starting at age 35, Get screened for diabetes at least every 3 years.  If you are younger than age 35, ask your care team if you should be screened for diabetes.  Cholesterol test: At age 39, start having a cholesterol test every 5 years, or more often if advised.  Bone density scan (DEXA): At age 50, ask your care team if you should have this scan for osteoporosis (brittle bones).  Hepatitis C: Get tested at least once in your life.  STIs (sexually transmitted infections)  Before age 24: Ask your care team if you should be screened for STIs.  After age 24: Get screened for STIs if you're at risk. You are at risk for STIs (including HIV) if:  You are  sexually active with more than one person.  You don't use condoms every time.  You or a partner was diagnosed with a sexually transmitted infection.  If you are at risk for HIV, ask about PrEP medicine to prevent HIV.  Get tested for HIV at least once in your life, whether you are at risk for HIV or not.  Cancer screening tests  Cervical cancer screening: If you have a cervix, begin getting regular cervical cancer screening tests starting at age 21.  Breast cancer scan (mammogram): If you've ever had breasts, begin having regular mammograms starting at age 40. This is a scan to check for breast cancer.  Colon cancer screening: It is important to start screening for colon cancer at age 45.  Have a colonoscopy test every 10 years (or more often if you're at risk) Or, ask your provider about stool tests like a FIT test every year or Cologuard test every 3 years.  To learn more about your testing options, visit:   .  For help making a decision, visit:   https://bit.ly/ua79864.  Prostate cancer screening test: If you have a prostate, ask your care team if a prostate cancer screening test (PSA) at age 55 is right for you.  Lung cancer screening: If you are a current or former smoker ages 50 to 80, ask your care team if ongoing lung cancer screenings are right for you.  For informational purposes only. Not to replace the advice of your health care provider. Copyright   2023 [employer]. All rights reserved. Clinically reviewed by the [facility] Transitions Program. [employer] [id number] - REV 01/24.

## 2024-09-09 NOTE — PROGRESS NOTES
Preventive Care Visit  [facility]  YULI Mejia CNP, Family Medicine  Sep 9, 2024      Assessment & Plan   Problem List Items Addressed This Visit    None  Visit Diagnoses       Routine general medical examination at a health care facility    -  Primary    Relevant Orders    CBC with platelets (Completed)    Lipid panel reflex to direct LDL Fasting    Comprehensive metabolic panel (BMP + Alb, Alk Phos, ALT, AST, Total. Bili, TP)    TSH with free T4 reflex    Chlamydia trachomatis/Neisseria gonorrhoeae by PCR - Clinic Collect    HPV and Gynecologic Cytology Panel - Recommended Age 30-65 Years    Insertion of implantable subdermal contraceptive        Relevant Medications    etonogestrel (NEXPLANON) subdermal implant 68 mg    Other Relevant Orders    INSERTION NON-BIODEGRADABLE DRUG DELIVERY IMPLANT (Completed)    Remove Contraceptive Implant - Nexplanon/Implanon (Completed)    Insert Contraceptive Implant - Nexplanon/Implanon (Completed)    Encounter for removal and reinsertion of Nexplanon        Visit for screening mammogram        Relevant Orders    MA Screening Bilateral w/ Olayinka    Cervical cancer screening                 The procedure was explained to the patient and informed Consent was obtained    Left arm placed above head with the patient in a supine position on the examining bed.  Nexplaonon placed site was identified, cleaned with betadine.  Using a 15 scalpel a 3 mm incision was made, using a small curved atrial forceps, the tip of the nexplaonon as identified and pulled out without complications.      Appropriate for Nexplanon Insertion today      PROCEDURE: Consent was reviewed and signed.  Patient was placed supine with left arm exposed.  Adal was made 8-10 cm above medial epicondyle and a guiding adal 4 cm above the first.  Arm was prepped with Betadine. Insertion point was anesthetized with 2   mL 1% lidocaine. After stretching the skin with thumb and index finger  "around the insertion site, skin punctured with the tip of the needle inserted at 30 degrees and then lowered to horizontal position. The needle was then advanced to its full length. Applicator was then stabilized and slider was unlocked. Slider was pulled back until it stopped and then removed. Steri strips were placed.   Correct placement of the implant was confirmed by palpation in the patient's arm and visualizing the purple top of the obturator.   Bandage and pressure dressing applied to insertion site. Pt tolerated procedure well without complications. EBL was minimal.     Patient has been advised of split billing requirements and indicates understanding: Yes       BMI  Estimated body mass index is 38.44 kg/m  as calculated from the following:    Height as of this encounter: 1.6 m (5' 3\").    Weight as of this encounter: 98.4 kg (217 lb).   Weight management plan: Discussed healthy diet and exercise guidelines    Counseling  Appropriate preventive services were addressed with this patient via screening, questionnaire, or discussion as appropriate for fall prevention, nutrition, physical activity, Tobacco-use cessation, social engagement, weight loss and cognition.  Checklist reviewing preventive services available has been given to the patient.  Reviewed patient's diet, addressing concerns and/or questions.   She is at risk for psychosocial distress and has been provided with information to reduce risk.     See Patient Instructions      Luz Begum is a 44 year old, presenting for the following:  Physical and Contraception        9/9/2024     1:21 PM   Additional Questions   Roomed by [facility] Care Directive  Patient does not have a Health Care Directive or Living Will: Discussed advance care planning with patient; information given to patient to review.    Contraception                9/9/2024   General Health   How would you rate your overall physical health? Good   Feel stress (tense, " anxious, or unable to sleep) Only a little      (!) STRESS CONCERN      9/9/2024   Nutrition   Three or more servings of calcium each day? (!) I DON'T KNOW   Diet: Regular (no restrictions)   How many servings of fruit and vegetables per day? (!) I DON'T KNOW   How many sweetened beverages each day? 0-1            9/9/2024   Exercise   Days per week of moderate/strenous exercise Patient declined   Average minutes spent exercising at this level Patient declined            9/9/2024   Social Factors   Frequency of gathering with friends or relatives More than three times a week   Worry food won't last until get money to buy more Yes   Food not last or not have enough money for food? Yes   Do you have housing? (Housing is defined as stable permanent housing and does not include staying ouside in a car, in a tent, in an abandoned building, in an overnight shelter, or couch-surfing.) Yes   Are you worried about losing your housing? No   Lack of transportation? Yes   Unable to get utilities (heat,electricity)? No      (!) FOOD SECURITY CONCERN PRESENT (!) TRANSPORTATION CONCERN PRESENT      9/9/2024   Dental   Dentist two times every year? (!) DECLINE            9/9/2024   TB Screening   Were you born outside of the [place]? No            Today's PHQ-2 Score:       9/9/2024    12:58 PM   PHQ-2 ( 1999 [employer])   Q1: Little interest or pleasure in doing things 0   Q2: Feeling down, depressed or hopeless 0   PHQ-2 Score 0   Q1: Little interest or pleasure in doing things Not at all   Q2: Feeling down, depressed or hopeless Not at all   PHQ-2 Score 0           9/9/2024   Substance Use   Alcohol more than 3/day or more than 7/wk No   Do you use any other substances recreationally? No        Social History     Tobacco Use    Smoking status: Former    Smokeless tobacco: Never   Vaping Use    Vaping status: Never Used   Substance Use Topics    Alcohol use: No    Drug use: No          Mammogram Screening - Mammogram every 1-2 years  updated in Health Maintenance based on mutual decision making          9/9/2024   One time HIV Screening   Previous HIV test? I don't know          9/9/2024   STI Screening   New sexual partner(s) since last STI/HIV test? No        History of abnormal Pap smear: No - age 30- 64 PAP with HPV every 5 years recommended        Latest Ref Rng & Units 9/5/2018    10:10 AM 9/5/2018     9:20 AM 8/21/2015    12:00 AM   PAP / HPV   PAP (Historical)  NIL      HPV 16 DNA NEG^Negative  Negative     HPV 18 DNA NEG^Negative  Negative     Other HR HPV NEG^Negative  Negative     PAP-ABSTRACT    See Scanned Document           This result is from an external source.     ASCVD Risk   The 10-year ASCVD risk score (Mikael PIZANO, et al., 2019) is: 2.1%    Values used to calculate the score:      Age: 44 years      Sex: Female      Is Non- : No      Diabetic: No      Tobacco smoker: No      Systolic Blood Pressure: 150 mmHg      Is BP treated: No      HDL Cholesterol: 40 mg/dL      Total Cholesterol: 221 mg/dL        9/9/2024   Contraception/Family Planning   Questions about contraception or family planning No           Reviewed and updated as needed this visit by Provider                    Labs reviewed in EPIC  BP Readings from Last 3 Encounters:   09/09/24 (!) 150/90   09/20/22 130/82   10/06/21 110/66    Wt Readings from Last 3 Encounters:   09/09/24 98.4 kg (217 lb)   09/20/22 94.8 kg (209 lb)   10/06/21 94.7 kg (208 lb 12.8 oz)                  There is no problem list on file for this patient.    No past surgical history on file.    Social History     Tobacco Use    Smoking status: Former    Smokeless tobacco: Never   Substance Use Topics    Alcohol use: No     Family History   Problem Relation Age of Onset    Cerebrovascular Disease Father     Cancer Paternal Grandmother     Lung Cancer Paternal Grandfather          Current Outpatient Medications   Medication Sig Dispense Refill    Etonogestrel  "(IMPLANON SC)       traZODone (DESYREL) 50 MG tablet Take 1-2 tablets ( mg) by mouth nightly as needed for sleep (Patient not taking: Reported on 9/16/2021) 30 tablet 3     No Known Allergies  Recent Labs   Lab Test 09/20/22  1818 06/04/21  1640 09/05/18  0927   A1C 5.2  --   --    *  --  149*   HDL 40*  --  35*   TRIG 200*  --  172*   ALT 28 29  --    CR 0.94 0.84 0.87   GFRESTIMATED 77 87 72   GFRESTBLACK  --  >90 88   POTASSIUM 3.6 3.6 3.8   TSH 1.09  --   --           Review of Systems  Constitutional, HEENT, cardiovascular, pulmonary, gi and gu systems are negative, except as otherwise noted.     Objective    Exam  /74   Pulse 89   Temp 98.9  F (37.2  C) (Tympanic)   Resp 20   Ht 1.6 m (5' 3\")   Wt 98.4 kg (217 lb)   LMP 08/27/2024   SpO2 98%   BMI 38.44 kg/m     Estimated body mass index is 38.44 kg/m  as calculated from the following:    Height as of this encounter: 1.6 m (5' 3\").    Weight as of this encounter: 98.4 kg (217 lb).    Physical Exam  GENERAL: alert and no distress  EYES: Eyes grossly normal to inspection, PERRL and conjunctivae and sclerae normal  HENT: ear canals and TM's normal, nose and mouth without ulcers or lesions  NECK: no adenopathy, no asymmetry, masses, or scars  RESP: lungs clear to auscultation - no rales, rhonchi or wheezes  CV: regular rate and rhythm, normal S1 S2, no S3 or S4, no murmur, click or rub, no peripheral edema  ABDOMEN: soft, nontender, no hepatosplenomegaly, no masses and bowel sounds normal   (female) w/bimanual: normal female external genitalia, normal urethral meatus, normal vaginal mucosa, and normal cervix/adnexa/uterus without masses or discharge  MS: no gross musculoskeletal defects noted, no edema  SKIN: no suspicious lesions or rashes  NEURO: Normal strength and tone, mentation intact and speech normal  PSYCH: mentation appears normal, affect normal/bright        Signed Electronically by: YULI Mejia CNP    "

## 2024-09-10 LAB
C TRACH DNA SPEC QL PROBE+SIG AMP: NEGATIVE
HPV HR 12 DNA CVX QL NAA+PROBE: NEGATIVE
HPV16 DNA CVX QL NAA+PROBE: NEGATIVE
HPV18 DNA CVX QL NAA+PROBE: NEGATIVE
HUMAN PAPILLOMA VIRUS FINAL DIAGNOSIS: NORMAL
N GONORRHOEA DNA SPEC QL NAA+PROBE: NEGATIVE

## 2024-09-12 LAB
BKR AP ASSOCIATED HPV REPORT: NORMAL
BKR LAB AP GYN ADEQUACY: NORMAL
BKR LAB AP GYN INTERPRETATION: NORMAL
BKR LAB AP LMP: NORMAL
BKR LAB AP PREVIOUS ABNORMAL: NORMAL
PATH REPORT.COMMENTS IMP SPEC: NORMAL
PATH REPORT.COMMENTS IMP SPEC: NORMAL
PATH REPORT.RELEVANT HX SPEC: NORMAL

## 2024-09-13 ENCOUNTER — DOCUMENTATION ONLY (OUTPATIENT)
Dept: FAMILY MEDICINE | Facility: CLINIC | Age: 44
End: 2024-09-13
Payer: COMMERCIAL

## 2024-09-13 DIAGNOSIS — Z13.1 SCREENING FOR DIABETES MELLITUS: Primary | ICD-10-CM

## 2024-09-13 NOTE — PROGRESS NOTES
[facility] was unable to run the hemoglobin A1c screening for diabetes with the blood that we kristian please make a lab only appointment in the next couple weeks to have that drawn.    Kristin Tena CNP

## 2024-09-13 NOTE — PROGRESS NOTES
The add-on test cbc that was requested on s9.13.24 is unable to be completed due to specimen is only good for up to 24 hours and sample is 4 days old. Future order is available for collection. If labs are needed before next appointment, please arrange for collection.

## 2025-05-01 ENCOUNTER — OFFICE VISIT (OUTPATIENT)
Dept: FAMILY MEDICINE | Facility: CLINIC | Age: 45
End: 2025-05-01

## 2025-05-01 VITALS
DIASTOLIC BLOOD PRESSURE: 98 MMHG | BODY MASS INDEX: 39.69 KG/M2 | OXYGEN SATURATION: 99 % | SYSTOLIC BLOOD PRESSURE: 142 MMHG | TEMPERATURE: 99.2 F | RESPIRATION RATE: 16 BRPM | HEIGHT: 63 IN | HEART RATE: 93 BPM | WEIGHT: 224 LBS

## 2025-05-01 DIAGNOSIS — H43.813 PVD (POSTERIOR VITREOUS DETACHMENT), BILATERAL: Primary | ICD-10-CM

## 2025-05-01 DIAGNOSIS — Z12.11 ENCOUNTER FOR SCREENING COLONOSCOPY FOR NON-HIGH-RISK PATIENT: ICD-10-CM

## 2025-05-01 DIAGNOSIS — Z12.11 ENCOUNTER FOR SCREENING FOR MALIGNANT NEOPLASM OF COLON: ICD-10-CM

## 2025-05-01 DIAGNOSIS — H52.4 PRESBYOPIA: ICD-10-CM

## 2025-05-01 DIAGNOSIS — H52.203 ASTIGMATISM OF BOTH EYES, UNSPECIFIED TYPE: ICD-10-CM

## 2025-05-01 DIAGNOSIS — F51.02 ADJUSTMENT INSOMNIA: ICD-10-CM

## 2025-05-01 DIAGNOSIS — F41.1 GENERALIZED ANXIETY DISORDER WITH PANIC ATTACKS: ICD-10-CM

## 2025-05-01 DIAGNOSIS — H43.393 VITREOUS FLOATERS OF BOTH EYES: ICD-10-CM

## 2025-05-01 DIAGNOSIS — R55 VASOVAGAL SYNCOPE: ICD-10-CM

## 2025-05-01 DIAGNOSIS — H53.60 NYCTALOPIA: ICD-10-CM

## 2025-05-01 DIAGNOSIS — F41.0 GENERALIZED ANXIETY DISORDER WITH PANIC ATTACKS: ICD-10-CM

## 2025-05-01 RX ORDER — TRAZODONE HYDROCHLORIDE 50 MG/1
50-100 TABLET ORAL
Qty: 90 TABLET | Refills: 3 | Status: SHIPPED | OUTPATIENT
Start: 2025-05-01

## 2025-05-01 ASSESSMENT — PATIENT HEALTH QUESTIONNAIRE - PHQ9
SUM OF ALL RESPONSES TO PHQ QUESTIONS 1-9: 6
SUM OF ALL RESPONSES TO PHQ QUESTIONS 1-9: 6
10. IF YOU CHECKED OFF ANY PROBLEMS, HOW DIFFICULT HAVE THESE PROBLEMS MADE IT FOR YOU TO DO YOUR WORK, TAKE CARE OF THINGS AT HOME, OR GET ALONG WITH OTHER PEOPLE: SOMEWHAT DIFFICULT

## 2025-05-01 ASSESSMENT — ANXIETY QUESTIONNAIRES
IF YOU CHECKED OFF ANY PROBLEMS ON THIS QUESTIONNAIRE, HOW DIFFICULT HAVE THESE PROBLEMS MADE IT FOR YOU TO DO YOUR WORK, TAKE CARE OF THINGS AT HOME, OR GET ALONG WITH OTHER PEOPLE: SOMEWHAT DIFFICULT
GAD7 TOTAL SCORE: 6
7. FEELING AFRAID AS IF SOMETHING AWFUL MIGHT HAPPEN: SEVERAL DAYS
2. NOT BEING ABLE TO STOP OR CONTROL WORRYING: SEVERAL DAYS
GAD7 TOTAL SCORE: 6
GAD7 TOTAL SCORE: 6
1. FEELING NERVOUS, ANXIOUS, OR ON EDGE: SEVERAL DAYS
4. TROUBLE RELAXING: SEVERAL DAYS
8. IF YOU CHECKED OFF ANY PROBLEMS, HOW DIFFICULT HAVE THESE MADE IT FOR YOU TO DO YOUR WORK, TAKE CARE OF THINGS AT HOME, OR GET ALONG WITH OTHER PEOPLE?: SOMEWHAT DIFFICULT
5. BEING SO RESTLESS THAT IT IS HARD TO SIT STILL: SEVERAL DAYS
7. FEELING AFRAID AS IF SOMETHING AWFUL MIGHT HAPPEN: SEVERAL DAYS
6. BECOMING EASILY ANNOYED OR IRRITABLE: NOT AT ALL
3. WORRYING TOO MUCH ABOUT DIFFERENT THINGS: SEVERAL DAYS

## 2025-05-01 ASSESSMENT — ENCOUNTER SYMPTOMS: NERVOUS/ANXIOUS: 1

## 2025-05-01 ASSESSMENT — PAIN SCALES - GENERAL: PAINLEVEL_OUTOF10: NO PAIN (0)

## 2025-05-01 NOTE — PROGRESS NOTES
Assessment & Plan     PVD (posterior vitreous detachment), bilateral  Seeing ophthalmology.    Vitreous floaters of both eyes  Seeing ophthalmology.    Presbyopia  Seeing ophthalmology.    Nyctalopia  Seeing ophthalmology.    Astigmatism of both eyes, unspecified type  Seeing ophthalmology.    Generalized anxiety disorder with panic attacks  Currently using coping mechanisms and declines medication at this time.  Due to visual issues patient is requesting accommodations for work since she has vision issues, currently working from home but now is being told she will need to drive to [place] to be in an office 50% of her work time which increases her anxiety due to vision issues and she has hx of significant panic attacks and vasovagal syncope with panic attacks.  Paperwork filled out for accommodations today and scanned to chart.    Vasovagal syncope  See note above.    Adjustment insomnia  Refilled  Trazodone for insomnia.  - traZODone (DESYREL) 50 MG tablet; Take 1-2 tablets ( mg) by mouth nightly as needed for sleep.    Encounter for screening for malignant neoplasm of colon  Colonoscopy ordered for screening.  - Colonoscopy Screening  Referral; Future    Patient advised to make mammography appointment for screening as well.    See AVS for patient instructions.    Luz Begum is a 45 year old, presenting for the following health issues:  Anxiety        9/9/2024     1:21 PM   Additional Questions   Roomed by Irina     Anxiety    History of Present Illness       Reason for visit:  ADA paperwork for job   She is taking medications regularly.          5/1/2025    12:36 PM   PHQ   PHQ-9 Total Score 6    Q9: Thoughts of better off dead/self-harm past 2 weeks Not at all       Patient-reported          5/1/2025    12:38 PM   SHANTE-7 SCORE   Total Score 6 (mild anxiety)   Total Score 6        Patient-reported        Anxiety   How are you doing with your anxiety since your last visit? Worsened  "due  to work stressors  Are you having other symptoms that might be associated with anxiety? Yes:     Have you had a significant life event? Job Concerns   Are you feeling depressed? No  Do you have any concerns with your use of alcohol or other drugs? No    Patient states that she has a lot of eye issues that are genetic and the symptoms have been worsening over the last few years.  She is able to pass driving tests barely with vision.  She tries to stay away from things or avoid things that she knows causes anxiety since she has such severe panic attacks with vasovagal symptoms, tachycardia and palpitations.  She states that she does not do any night driving and tries to drive in areas outside of the loop of the [facility] since when she sees signs she cannot always react quickly enough and is afraid that she may cause a problem.  She also states away from road construction since this also causes her anxiety go up as well.  She was working from home and changed her job to another job that she works from home but now they are calling people back 50% of the time to a [place] office and she is \"freaking out\".  She is requesting paperwork be filled out today for accommodations.  She states that she prefers not to be on medication for her anxiety and when she is talking to me today her speech is very fast-paced with her anxiety.  She would like to remain working from home 100% and needs paperwork filled out for this today.    Patient also was put on trazodone few years ago for sleep and she felt this was very beneficial but then she got her job and she was doing fine she did not feel it was needed anymore.  With this new stress she would like to get back on the trazodone.    Patient is willing to do colonoscopy since she knows she is due for it and is asking if she should be getting her mammogram as well which is also due.    Social History     Tobacco Use    Smoking status: Former    Smokeless tobacco: Never   Vaping " "Use    Vaping status: Never Used   Substance Use Topics    Alcohol use: No    Drug use: No         5/1/2025    12:38 PM   SHANTE-7 SCORE   Total Score 6 (mild anxiety)   Total Score 6        Patient-reported         5/1/2025    12:36 PM   PHQ   PHQ-9 Total Score 6    Q9: Thoughts of better off dead/self-harm past 2 weeks Not at all       Patient-reported     How many servings of fruits and vegetables do you eat daily?  2-3  On average, how many sweetened beverages do you drink each day (Examples: soda, juice, sweet tea, etc.  Do NOT count diet or artificially sweetened beverages)?   2  How many days per week do you exercise enough to make your heart beat faster? 3 or less  How many minutes a day do you exercise enough to make your heart beat faster? 30 - 60  How many days per week do you miss taking your medication? 0      Review of Systems  CONSTITUTIONAL: NEGATIVE for fever, chills, change in weight  EYES: POSITIVE for multiple eye diagnoses and follows with ophthalmology  PSYCHIATRIC: POSITIVE for anxiety and panic attacks as per HPI  ROS otherwise negative      Objective    BP (!) 142/88   Pulse 93   Temp 99.2  F (37.3  C) (Tympanic)   Resp 16   Ht 1.588 m (5' 2.5\")   Wt 101.6 kg (224 lb)   SpO2 99%   BMI 40.32 kg/m    Body mass index is 40.32 kg/m .  Physical Exam   GENERAL: alert and no distress  RESP: lungs clear to auscultation - no rales, rhonchi or wheezes  CV: regular rate and rhythm, normal S1 S2, no S3 or S4, no murmur, click or rub, no peripheral edema  PSYCH: mentation appears normal, anxious, and speech pressured         Signed Electronically by: Tomeka Smith NP    "

## 2025-05-01 NOTE — PATIENT INSTRUCTIONS
Paperwork filled out today for accommodations.  Please contact me if you need further paperwork signed.  Trazodone refilled for a year for sleep.  If anxiety worsens and you feel like starting medication, follow-up in clinic.  Make appointment for colonoscopy and mammogram screenings.

## 2025-05-02 ENCOUNTER — TRANSFERRED RECORDS (OUTPATIENT)
Dept: HEALTH INFORMATION MANAGEMENT | Facility: CLINIC | Age: 45
End: 2025-05-02

## 2025-08-11 ENCOUNTER — PATIENT OUTREACH (OUTPATIENT)
Dept: CARE COORDINATION | Facility: CLINIC | Age: 45
End: 2025-08-11
Payer: COMMERCIAL